# Patient Record
Sex: FEMALE | Race: WHITE | URBAN - METROPOLITAN AREA
[De-identification: names, ages, dates, MRNs, and addresses within clinical notes are randomized per-mention and may not be internally consistent; named-entity substitution may affect disease eponyms.]

---

## 2017-01-26 DIAGNOSIS — G43.919 INTRACTABLE MIGRAINE, UNSPECIFIED MIGRAINE TYPE: ICD-10-CM

## 2017-01-26 RX ORDER — SUMATRIPTAN 50 MG/1
TABLET, FILM COATED ORAL
Qty: 12 TAB | Refills: 0 | Status: SHIPPED | OUTPATIENT
Start: 2017-01-26 | End: 2018-01-30 | Stop reason: ALTCHOICE

## 2017-04-24 ENCOUNTER — OFFICE VISIT (OUTPATIENT)
Dept: INTERNAL MEDICINE CLINIC | Age: 32
End: 2017-04-24

## 2017-04-24 VITALS
OXYGEN SATURATION: 94 % | RESPIRATION RATE: 20 BRPM | WEIGHT: 208 LBS | BODY MASS INDEX: 30.81 KG/M2 | SYSTOLIC BLOOD PRESSURE: 112 MMHG | HEART RATE: 80 BPM | TEMPERATURE: 98.5 F | DIASTOLIC BLOOD PRESSURE: 83 MMHG | HEIGHT: 69 IN

## 2017-04-24 DIAGNOSIS — E78.00 ELEVATED CHOLESTEROL: Primary | ICD-10-CM

## 2017-04-24 DIAGNOSIS — E28.2 PCO (POLYCYSTIC OVARIES): ICD-10-CM

## 2017-04-24 DIAGNOSIS — E66.3 OVERWEIGHT: ICD-10-CM

## 2017-04-24 NOTE — MR AVS SNAPSHOT
Visit Information Date & Time Provider Department Dept. Phone Encounter #  
 4/24/2017  7:50 AM Mehreen Colon PA-C [facility] [phone] [id number] Your Appointments 7/6/2017  7:50 AM  
COMPLETE 40 with Kyle Valdovinos PA-C [facility] ([facility]) Appt Note: Well women [place] [address] [facility] [address]  
[facility] [address] [address] Upcoming Health Maintenance Date Due DTaP/Tdap/Td series (1 - Tdap) 10/5/2008 PAP AKA CERVICAL CYTOLOGY 5/30/2017 Allergies as of 4/24/2017  Review Complete On: 4/24/2017 By: Mehreen Colon PA-C No Known Allergies Current Immunizations  Never Reviewed Name Date Influenza Vaccine 11/12/2015 Not reviewed this visit You Were Diagnosed With   
  
 Codes Comments Elevated cholesterol    -  Primary ICD-10-CM: E78.00 ICD-9-CM: 272.0   
 PCO (polycystic ovaries)     ICD-10-CM: E28.2 ICD-9-CM: 256.4 Overweight     ICD-10-CM: V06.0 ICD-9-CM: 278.02 Vitals BP Pulse Temp Resp Height(growth percentile) Weight(growth percentile) 112/83 80 98.5 °F (36.9 °C) (Oral) 20 5' 9\" (1.753 m) 208 lb (94.3 kg) LMP SpO2 BMI OB Status Smoking Status 04/21/2017 (Exact Date) 94% 30.72 kg/m2 Having regular periods Former Smoker Vitals History BMI and BSA Data Body Mass Index Body Surface Area 30.72 kg/m 2 2.14 m 2 Preferred Pharmacy Pharmacy Name Phone [facility] [address] [facility] [address] Your Updated Medication List  
  
   
This list is accurate as of: 4/24/17  8:40 AM.  Always use your most recent med list.  
  
  
  
  
 atorvastatin 20 mg tablet Commonly known as:  LIPITOR Take 1 Tab by mouth daily. butalbital-acetaminophen-caffeine -40 mg per tablet Commonly known as:  Reginold Pablolder Take 1 Tab by mouth every six (6) hours as needed for Pain. Max Daily Amount: 4 Tabs. drospirenone-ethinyl estradiol 3-0.02 mg Tab Commonly known as:  Bryanmartín Avelar (28) Take 1 Tab by mouth daily. fluticasone 50 mcg/actuation nasal spray Commonly known as:  FLONASE  
2 sprays by Both Nostrils route daily. SUMAtriptan 50 mg tablet Commonly known as:  IMITREX Take one tablet once. May repeat dose X 1 after 2 hours We Performed the Following GLUCOSE, RANDOM [id number] CPT(R)] LIPID PANEL [56613 CPT(R)] Please provide this summary of care documentation to your next provider. Your primary care clinician is listed as Cleo Valdovinos. If you have any questions after today's visit, please call [phone].

## 2017-04-24 NOTE — PROGRESS NOTES
Reviewed record in preparation for visit and have obtained necessary documentation. Identified pt with two pt identifiers(name and ). Health Maintenance Due   Topic    DTaP/Tdap/Td series (1 - Tdap)    INFLUENZA AGE 9 TO ADULT     PAP AKA CERVICAL CYTOLOGY          No chief complaint on file. Wt Readings from Last 3 Encounters:   17 208 lb (94.3 kg)   11/15/16 199 lb (90.3 kg)   10/20/16 197 lb (89.4 kg)     Temp Readings from Last 3 Encounters:   17 98.5 °F (36.9 °C) (Oral)   11/15/16 98.6 °F (37 °C) (Oral)   10/20/16 98.8 °F (37.1 °C) (Oral)     BP Readings from Last 3 Encounters:   17 112/83   11/15/16 122/87   10/20/16 109/79     Pulse Readings from Last 3 Encounters:   17 80   11/15/16 91   10/20/16 78           Learning Assessment:  :     Learning Assessment 2017 11/15/2016 10/7/2014 2014   PRIMARY LEARNER Patient Patient Patient Patient   HIGHEST LEVEL OF EDUCATION - PRIMARY LEARNER  - - 2 YEARS [address] LEARNER - - NONE -   CO-LEARNER CAREGIVER - - No -   PRIMARY LANGUAGE ENGLISH ENGLISH ENGLISH ENGLISH   LEARNER PREFERENCE PRIMARY DEMONSTRATION DEMONSTRATION DEMONSTRATION LISTENING     - - VIDEOS DEMONSTRATION   ANSWERED BY self self self patient   RELATIONSHIP SELF SELF SELF SELF       Depression Screening:  :     PHQ 2 / 9, over the last two weeks 11/15/2016   Little interest or pleasure in doing things Not at all   Feeling down, depressed or hopeless Not at all   Total Score PHQ 2 0       Fall Risk Assessment:  :     No flowsheet data found. Abuse Screening:  :     Abuse Screening Questionnaire 2017   Do you ever feel afraid of your partner? N   Are you in a relationship with someone who physically or mentally threatens you? N   Is it safe for you to go home?  Y       Coordination of Care Questionnaire:  :     1) Have you been to an emergency room, urgent care clinic since your last visit? no   Hospitalized since your last visit? no             2) Have you seen or consulted any other health care providers outside of [address] [place] [address] since your last visit? no  (Include any pap smears or colon screenings in this section.)    3) Do you have an Advance Directive on file? no    4) Are you interested in receiving information on Advance Directives? YES      Patient is accompanied by self I have received verbal consent from [address] to discuss any/all medical information while they are present in the room.

## 2017-04-24 NOTE — PROGRESS NOTES
HISTORY OF PRESENT Velia Kenney is a 36 y.o. female. HPI  Patient presents to the office for a number of concerns. She reports when she saw her gyn doctor she had elevated cholesterol and needed to have this rechecked. She reports she is still having headaches usually once a week. Imitrex does help some. Excedrin Migraine seems to work the best. She recently moved in with her boyfriend and started a new job. She will be due for a pap smear in July and would like to come here to have it done if possible. She is interested in losing some weight. She has access to a gym. She admits she likes carbs and she often will eat out with her boyfriend. Review of Systems   HENT:        Once a week, usually resolves with Excedrin migraine   Neurological: Positive for headaches. Blood pressure 112/83, pulse 80, temperature 98.5 °F (36.9 °C), temperature source Oral, resp. rate 20, height 5' 9\" (1.753 m), weight 208 lb (94.3 kg), last menstrual period 04/21/2017, SpO2 94 %. Physical Exam   Constitutional: She appears well-developed and well-nourished. No distress. Psychiatric: She has a normal mood and affect. Her behavior is normal. Judgment and thought content normal.       ASSESSMENT and PLAN  Thom was seen today for follow up chronic condition. Diagnoses and all orders for this visit:    Elevated cholesterol  -     LIPID PANEL    PCO (polycystic ovaries)  -     GLUCOSE, RANDOM    Overweight    we talked at length about diet. She was advised cut back on carbs. She was given educational material about carb counting and healthy eating habits.  She will make an appointment to return for a well woman exam. I advised her if she is using the Excedrin only once a week that is okay but if she is using it more than that then let me know because I do now want her to have potential problems of her stomach

## 2017-04-25 ENCOUNTER — TELEPHONE (OUTPATIENT)
Dept: INTERNAL MEDICINE CLINIC | Age: 32
End: 2017-04-25

## 2017-04-25 LAB
CHOLEST SERPL-MCNC: 328 MG/DL (ref 100–199)
GLUCOSE SERPL-MCNC: 74 MG/DL (ref 65–99)
HDLC SERPL-MCNC: 65 MG/DL
INTERPRETATION, 910389: NORMAL
LDLC SERPL CALC-MCNC: 236 MG/DL (ref 0–99)
TRIGL SERPL-MCNC: 136 MG/DL (ref 0–149)
VLDLC SERPL CALC-MCNC: 27 MG/DL (ref 5–40)

## 2017-04-25 NOTE — PROGRESS NOTES
Writer contacted patient to inform of lab results per Linda Bermudez, patient verbalized understanding and will try exercise and watching her diet, patient states the medication is expensive and her numbers have come down without the medication.

## 2017-04-25 NOTE — PROGRESS NOTES
Patient had stopped the medication, it appears possibly due to cost, but would like to try the diet and exercise first.

## 2017-04-25 NOTE — PROGRESS NOTES
Please let the patient know her glucose was normal at 74. Her cholesterol is elevated at 328 total (goal under 200) and . She should be on a lipid lowering medication. I would like for her to start a statin to help with this. Other things that she can do to help is exercise regularly, diet low in saturated fats. High fiber diet. Eat foods high in omega 3's. Recheck numbers in 6 months.  thanks

## 2017-05-30 ENCOUNTER — OFFICE VISIT (OUTPATIENT)
Dept: INTERNAL MEDICINE CLINIC | Age: 32
End: 2017-05-30

## 2017-05-30 VITALS
HEART RATE: 86 BPM | SYSTOLIC BLOOD PRESSURE: 110 MMHG | RESPIRATION RATE: 15 BRPM | BODY MASS INDEX: 31.4 KG/M2 | OXYGEN SATURATION: 98 % | TEMPERATURE: 98.4 F | DIASTOLIC BLOOD PRESSURE: 78 MMHG | HEIGHT: 69 IN | WEIGHT: 212 LBS

## 2017-05-30 DIAGNOSIS — J02.9 SORE THROAT: Primary | ICD-10-CM

## 2017-05-30 RX ORDER — AMOXICILLIN 500 MG/1
500 CAPSULE ORAL 2 TIMES DAILY
Qty: 20 CAP | Refills: 0 | Status: SHIPPED | OUTPATIENT
Start: 2017-05-30 | End: 2017-07-06 | Stop reason: ALTCHOICE

## 2017-05-30 NOTE — PROGRESS NOTES
Subjective:   Tad Sanders is a 36 y.o. female who complains of sore throat and swollen glands for 4 days. She denies a history of chills, fevers, shortness of breath and wheezing. Patient does not smoke cigarettes. Relevant PMH: No pertinent additional PMH. Objective:      Visit Vitals    /78 (BP 1 Location: Right arm, BP Patient Position: Sitting)    Pulse 86    Temp 98.4 °F (36.9 °C) (Oral)    Resp 15    Ht 5' 9\" (1.753 m)    Wt 212 lb (96.2 kg)    SpO2 98%    BMI 31.31 kg/m2      Appears alert, well appearing, and in no distress. Ears: bilateral TM's and external ear canals normal  Oropharynx: erythematous, exudate noted and tonsils hypertrophied with exudate  Neck: bilateral symmetric anterior adenopathy  Lungs: clear to auscultation, no wheezes, rales or rhonchi, symmetric air entry     Rapid Strep test is not done in the office will send for a culture. Assessment/Plan:      pharyngitis  Per orders. Gargle, use acetaminophen or other OTC analgesic, and take Rx fully as prescribed. Call if other family members develop similar symptoms. See prn. Thom was seen today for sore throat. Diagnoses and all orders for this visit:    Sore throat  -     amoxicillin (AMOXIL) 500 mg capsule; Take 1 Cap by mouth two (2) times a day. -     CULTURE, STREP THROAT    .take medication as prescribed.  I will contact her with the results of the culture once back

## 2017-05-30 NOTE — LETTER
NOTIFICATION RETURN TO WORK / SCHOOL 
 
5/30/2017 8:22 AM 
 
Ms. Thom Cadena [address] [place] [address] To Whom It May Concern: Yessica Blum is currently under the care of Bri Thompson.. She will return to work/school on: 5/31/2017 If there are questions or concerns please have the patient contact our office.  
 
 
 
Sincerely, 
 
 
Trav Valdovinos PA-C

## 2017-06-02 LAB — B-HEM STREP SPEC QL CULT: ABNORMAL

## 2017-06-02 NOTE — PROGRESS NOTES
Spoke with the patient using 2 identifiers. Notified her per Cleo Valdovinos's recommendation. She states understanding and will complete the antibiotic.

## 2017-07-06 ENCOUNTER — OFFICE VISIT (OUTPATIENT)
Dept: INTERNAL MEDICINE CLINIC | Age: 32
End: 2017-07-06

## 2017-07-06 VITALS
HEART RATE: 79 BPM | RESPIRATION RATE: 20 BRPM | SYSTOLIC BLOOD PRESSURE: 109 MMHG | BODY MASS INDEX: 31.55 KG/M2 | TEMPERATURE: 98.6 F | DIASTOLIC BLOOD PRESSURE: 80 MMHG | HEIGHT: 69 IN | OXYGEN SATURATION: 99 % | WEIGHT: 213 LBS

## 2017-07-06 DIAGNOSIS — Z01.419 WELL WOMAN EXAM WITH ROUTINE GYNECOLOGICAL EXAM: Primary | ICD-10-CM

## 2017-07-06 DIAGNOSIS — E28.2 PCO (POLYCYSTIC OVARIES): ICD-10-CM

## 2017-07-06 RX ORDER — DROSPIRENONE AND ETHINYL ESTRADIOL 0.02-3(28)
1 KIT ORAL DAILY
Qty: 84 TAB | Refills: 4 | Status: SHIPPED | OUTPATIENT
Start: 2017-07-06 | End: 2018-03-05 | Stop reason: SDUPTHER

## 2017-07-06 NOTE — PROGRESS NOTES
Reviewed record in preparation for visit and have obtained necessary documentation. Identified pt with two pt identifiers(name and ). Health Maintenance Due   Topic    DTaP/Tdap/Td series (1 - Tdap)    PAP AKA CERVICAL CYTOLOGY          No chief complaint on file. Wt Readings from Last 3 Encounters:   17 213 lb (96.6 kg)   17 212 lb (96.2 kg)   17 208 lb (94.3 kg)     Temp Readings from Last 3 Encounters:   17 98.6 °F (37 °C) (Oral)   17 98.4 °F (36.9 °C) (Oral)   17 98.5 °F (36.9 °C) (Oral)     BP Readings from Last 3 Encounters:   17 109/80   17 110/78   17 112/83     Pulse Readings from Last 3 Encounters:   17 79   17 86   17 80           Learning Assessment:  :     Learning Assessment 2017 11/15/2016 10/7/2014 2014   PRIMARY LEARNER Patient Patient Patient Patient   HIGHEST LEVEL OF EDUCATION - PRIMARY LEARNER  - - 2 YEARS [address] LEARNER - - NONE -   [address]  CAREGIVER - - No -   PRIMARY LANGUAGE ENGLISH ENGLISH ENGLISH ENGLISH   LEARNER PREFERENCE PRIMARY DEMONSTRATION DEMONSTRATION DEMONSTRATION LISTENING     - - VIDEOS DEMONSTRATION   ANSWERED BY self self self patient   RELATIONSHIP SELF SELF SELF SELF       Depression Screening:  :     PHQ over the last two weeks 2017   Little interest or pleasure in doing things Not at all   Feeling down, depressed or hopeless Not at all   Total Score PHQ 2 0       Fall Risk Assessment:  :     No flowsheet data found. Abuse Screening:  :     Abuse Screening Questionnaire 2017   Do you ever feel afraid of your partner? N   Are you in a relationship with someone who physically or mentally threatens you? N   Is it safe for you to go home?  Y       Coordination of Care Questionnaire:  :     1) Have you been to an emergency room, urgent care clinic since your last visit? no   Hospitalized since your last visit? no             2) Have you seen or consulted any other health care providers outside of [address] [place] [address] since your last visit? no  (Include any pap smears or colon screenings in this section.)    3) Do you have an Advance Directive on file? no    4) Are you interested in receiving information on Advance Directives? YES      Patient is accompanied by self I have received verbal consent from [address] to discuss any/all medical information while they are present in the room.

## 2017-07-06 NOTE — PROGRESS NOTES
Subjective:   36 y.o. female for Well Woman Check. Patient's last menstrual period was 06/16/2017 (exact date). Social History: single partner, contraception - OCP (Oral Contraceptive Pills). Pertinent past medical hstory: no history of HTN, DVT, CAD, DM, liver disease, or smoking. Patient reports she is exercising regularly with walking, elliptical, and some weights. She is eating healthy. She has been eating out some due to her schedule and not having time to cook. She denies problems with the birth control. She is taking this for her PCO. Patient Active Problem List    Diagnosis Date Noted    Performance anxiety 05/22/2014    Elevated cholesterol 05/22/2014     No Known Allergies     ROS:  Feeling well. No dyspnea or chest pain on exertion. No abdominal pain, change in bowel habits, black or bloody stools. No urinary tract symptoms. GYN ROS: normal menses, no abnormal bleeding, pelvic pain or discharge, no breast pain or new or enlarging lumps on self exam. No neurological complaints. Objective:     Visit Vitals    /80    Pulse 79    Temp 98.6 °F (37 °C) (Oral)    Resp 20    Ht 5' 9\" (1.753 m)    Wt 213 lb (96.6 kg)    LMP 06/16/2017 (Exact Date)    SpO2 99%    BMI 31.45 kg/m2     The patient appears well, alert, oriented x 3, in no distress. ENT normal.  Neck supple. No adenopathy or thyromegaly. TRENT. Lungs are clear, good air entry, no wheezes, rhonchi or rales. S1 and S2 normal, no murmurs, regular rate and rhythm. Abdomen soft without tenderness, guarding, mass or organomegaly. Extremities show no edema, normal peripheral pulses. Neurological is normal, no focal findings.     BREAST EXAM: breasts appear normal, no suspicious masses, no skin or nipple changes or axillary nodes    PELVIC EXAM: VULVA: normal appearing vulva with no masses, tenderness or lesions, VAGINA: normal appearing vagina with normal color and discharge, no lesions, mild white discharge, no odor, CERVIX: normal appearing cervix without discharge or lesions, UTERUS: uterus is normal size, shape, consistency and nontender, ADNEXA: normal adnexa in size, nontender and no masses    Assessment/Plan:   well woman  pap smear  additional lab tests per orders  return annually or fadia  Thom was seen today for complete physical.    Diagnoses and all orders for this visit:    Well woman exam with routine gynecological exam  -     PAP IG, CT-NG-TV, RFX APTIMA HPV ASCUS ([id number])  -     TSH 3RD GENERATION  -     CBC WITH AUTOMATED DIFF  -     METABOLIC PANEL, COMPREHENSIVE  -     LIPID PANEL  -     VITAMIN D, 25 HYDROXY    PCO (polycystic ovaries)  -     drospirenone-ethinyl estradiol (GIANVI, 28,) 3-0.02 mg tab; Take 1 Tab by mouth daily. .routine labs ordered today. Advised her to start doing self breast exams once a month. Suggested she set it on her phone calendar to help her remember. Continue to exercise and eat healthy. I will contact her with her labs once they are back.

## 2017-07-07 LAB
25(OH)D3+25(OH)D2 SERPL-MCNC: 23.4 NG/ML (ref 30–100)
ALBUMIN SERPL-MCNC: 4.1 G/DL (ref 3.5–5.5)
ALBUMIN/GLOB SERPL: 1.4 {RATIO} (ref 1.2–2.2)
ALP SERPL-CCNC: 61 IU/L (ref 39–117)
ALT SERPL-CCNC: 18 IU/L (ref 0–32)
AST SERPL-CCNC: 15 IU/L (ref 0–40)
BASOPHILS # BLD AUTO: 0 X10E3/UL (ref 0–0.2)
BASOPHILS NFR BLD AUTO: 0 %
BILIRUB SERPL-MCNC: 0.2 MG/DL (ref 0–1.2)
BUN SERPL-MCNC: 11 MG/DL (ref 6–20)
BUN/CREAT SERPL: 20 (ref 9–23)
CALCIUM SERPL-MCNC: 9.2 MG/DL (ref 8.7–10.2)
CHLORIDE SERPL-SCNC: 100 MMOL/L (ref 96–106)
CHOLEST SERPL-MCNC: 389 MG/DL (ref 100–199)
CO2 SERPL-SCNC: 22 MMOL/L (ref 18–29)
COMMENT, 011824: ABNORMAL
CREAT SERPL-MCNC: 0.56 MG/DL (ref 0.57–1)
EOSINOPHIL # BLD AUTO: 0 X10E3/UL (ref 0–0.4)
EOSINOPHIL NFR BLD AUTO: 0 %
ERYTHROCYTE [DISTWIDTH] IN BLOOD BY AUTOMATED COUNT: 13.5 % (ref 12.3–15.4)
GLOBULIN SER CALC-MCNC: 2.9 G/DL (ref 1.5–4.5)
GLUCOSE SERPL-MCNC: 73 MG/DL (ref 65–99)
HCT VFR BLD AUTO: 38.8 % (ref 34–46.6)
HDLC SERPL-MCNC: 85 MG/DL
HGB BLD-MCNC: 13 G/DL (ref 11.1–15.9)
IMM GRANULOCYTES # BLD: 0 X10E3/UL (ref 0–0.1)
IMM GRANULOCYTES NFR BLD: 0 %
INTERPRETATION, 910389: NORMAL
LDLC SERPL CALC-MCNC: 270 MG/DL (ref 0–99)
LYMPHOCYTES # BLD AUTO: 2.1 X10E3/UL (ref 0.7–3.1)
LYMPHOCYTES NFR BLD AUTO: 27 %
MCH RBC QN AUTO: 28.3 PG (ref 26.6–33)
MCHC RBC AUTO-ENTMCNC: 33.5 G/DL (ref 31.5–35.7)
MCV RBC AUTO: 84 FL (ref 79–97)
MONOCYTES # BLD AUTO: 0.5 X10E3/UL (ref 0.1–0.9)
MONOCYTES NFR BLD AUTO: 6 %
NEUTROPHILS # BLD AUTO: 5.2 X10E3/UL (ref 1.4–7)
NEUTROPHILS NFR BLD AUTO: 67 %
PLATELET # BLD AUTO: 333 X10E3/UL (ref 150–379)
POTASSIUM SERPL-SCNC: 4.1 MMOL/L (ref 3.5–5.2)
PROT SERPL-MCNC: 7 G/DL (ref 6–8.5)
RBC # BLD AUTO: 4.6 X10E6/UL (ref 3.77–5.28)
SODIUM SERPL-SCNC: 140 MMOL/L (ref 134–144)
TRIGL SERPL-MCNC: 170 MG/DL (ref 0–149)
TSH SERPL DL<=0.005 MIU/L-ACNC: 1.52 UIU/ML (ref 0.45–4.5)
VLDLC SERPL CALC-MCNC: 34 MG/DL (ref 5–40)
WBC # BLD AUTO: 7.8 X10E3/UL (ref 3.4–10.8)

## 2017-07-07 NOTE — PROGRESS NOTES
Please let the patient know her cholesterol is more elevated this time. I would like  her to increase her Lipitor to 40 mg. I would like to recheck her numbers in 3 months. If not at goal then the next step would be to switch to Crestor. vitamin d is just a little low. Daily otc vitamin supplement is suggested.  thanks

## 2017-07-07 NOTE — PROGRESS NOTES
Writer contacted patient to inform of lab results and instruction per [facility], patient verbalized understanding.

## 2017-07-17 ENCOUNTER — TELEPHONE (OUTPATIENT)
Dept: INTERNAL MEDICINE CLINIC | Age: 32
End: 2017-07-17

## 2017-07-17 NOTE — TELEPHONE ENCOUNTER
Writer contacted patient after speaking with the lab and the specimen is being retested on a portion of the test and will have results soon, patient verbalized understanding.

## 2017-07-21 LAB
C TRACH RRNA CVX QL NAA+PROBE: NORMAL
CYTOLOGIST CVX/VAG CYTO: NORMAL
CYTOLOGY CVX/VAG DOC THIN PREP: NORMAL
DX ICD CODE: NORMAL
DX ICD CODE: NORMAL
LABCORP, 190119: NORMAL
Lab: NORMAL
N GONORRHOEA RRNA CVX QL NAA+PROBE: NEGATIVE
OTHER STN SPEC: NORMAL
PATH REPORT.FINAL DX SPEC: NORMAL
STAT OF ADQ CVX/VAG CYTO-IMP: NORMAL
T VAGINALIS RRNA SPEC QL NAA+PROBE: NEGATIVE

## 2017-07-24 ENCOUNTER — CLINICAL SUPPORT (OUTPATIENT)
Dept: INTERNAL MEDICINE CLINIC | Age: 32
End: 2017-07-24

## 2017-07-24 DIAGNOSIS — R35.0 URINARY FREQUENCY: Primary | ICD-10-CM

## 2017-07-24 DIAGNOSIS — Z11.3 SCREENING FOR STDS (SEXUALLY TRANSMITTED DISEASES): ICD-10-CM

## 2017-07-24 DIAGNOSIS — R35.0 FREQUENCY OF URINATION: Primary | ICD-10-CM

## 2017-07-24 LAB
BILIRUB UR QL STRIP: NEGATIVE
GLUCOSE UR-MCNC: NEGATIVE MG/DL
KETONES P FAST UR STRIP-MCNC: NEGATIVE MG/DL
PH UR STRIP: 6 [PH] (ref 4.6–8)
PROT UR QL STRIP: NEGATIVE MG/DL
SP GR UR STRIP: 1.01 (ref 1–1.03)
UA UROBILINOGEN AMB POC: NORMAL (ref 0.2–1)
URINALYSIS CLARITY POC: CLEAR
URINALYSIS COLOR POC: YELLOW
URINE BLOOD POC: NEGATIVE
URINE LEUKOCYTES POC: NEGATIVE
URINE NITRITES POC: NEGATIVE

## 2017-07-24 NOTE — TELEPHONE ENCOUNTER
Writer contacted patient in regards to pap results per Flory Davies, patient verbalized understanding and will come in sometime this week to give a urine sample.

## 2017-07-24 NOTE — PROGRESS NOTES
Please let the patient know her pap smear was normal. She did have some vaginal abner consistent with a yeast infection. Is patient having symptoms?  thanks

## 2017-07-24 NOTE — PROGRESS NOTES
Writer contacted patient to inform of pap results per Rafia Davis, patient verbalized understanding and is not having symptoms of a yeast infection at this time. Patient will call if any symptoms occur.

## 2017-07-27 LAB
C TRACH RRNA SPEC QL NAA+PROBE: NEGATIVE
HSV1 DNA SPEC QL NAA+PROBE: NORMAL
HSV2 DNA SPEC QL NAA+PROBE: NORMAL
N GONORRHOEA RRNA SPEC QL NAA+PROBE: NEGATIVE
T VAGINALIS RRNA SPEC QL NAA+PROBE: NEGATIVE

## 2017-07-28 NOTE — PROGRESS NOTES
Writer contacted patient to inform of lab results per Maty Parikh, patient verbalized understanding.

## 2017-09-05 ENCOUNTER — OFFICE VISIT (OUTPATIENT)
Dept: INTERNAL MEDICINE CLINIC | Age: 32
End: 2017-09-05

## 2017-09-05 VITALS
BODY MASS INDEX: 31.84 KG/M2 | DIASTOLIC BLOOD PRESSURE: 83 MMHG | RESPIRATION RATE: 20 BRPM | HEIGHT: 69 IN | SYSTOLIC BLOOD PRESSURE: 125 MMHG | OXYGEN SATURATION: 98 % | HEART RATE: 83 BPM | TEMPERATURE: 98.6 F | WEIGHT: 215 LBS

## 2017-09-05 DIAGNOSIS — K90.49 FOOD INTOLERANCE: ICD-10-CM

## 2017-09-05 DIAGNOSIS — R53.83 FATIGUE, UNSPECIFIED TYPE: Primary | ICD-10-CM

## 2017-09-05 RX ORDER — MUPIROCIN 20 MG/G
OINTMENT TOPICAL DAILY
Qty: 22 G | Refills: 0 | Status: SHIPPED | OUTPATIENT
Start: 2017-09-05 | End: 2019-02-05 | Stop reason: ALTCHOICE

## 2017-09-05 NOTE — PROGRESS NOTES
Reviewed record in preparation for visit and have obtained necessary documentation. Identified pt with two pt identifiers(name and ). Health Maintenance Due   Topic    DTaP/Tdap/Td series (1 - Tdap)    INFLUENZA AGE 9 TO ADULT          No chief complaint on file. Wt Readings from Last 3 Encounters:   17 215 lb (97.5 kg)   17 213 lb (96.6 kg)   17 212 lb (96.2 kg)     Temp Readings from Last 3 Encounters:   17 98.6 °F (37 °C) (Oral)   17 98.6 °F (37 °C) (Oral)   17 98.4 °F (36.9 °C) (Oral)     BP Readings from Last 3 Encounters:   17 109/80   17 110/78   17 112/83     Pulse Readings from Last 3 Encounters:   17 79   17 86   17 80           Learning Assessment:  :     Learning Assessment 2017 11/15/2016 10/7/2014 2014   PRIMARY LEARNER Patient Patient Patient Patient   HIGHEST LEVEL OF EDUCATION - PRIMARY LEARNER  - - 2 YEARS [address] LEARNER - - NONE -   CO-LEARNER CAREGIVER - - No -   PRIMARY LANGUAGE ENGLISH ENGLISH ENGLISH ENGLISH   LEARNER PREFERENCE PRIMARY DEMONSTRATION DEMONSTRATION DEMONSTRATION LISTENING     - - VIDEOS DEMONSTRATION   ANSWERED BY self self self patient   RELATIONSHIP SELF SELF SELF SELF       Depression Screening:  :     PHQ over the last two weeks 2017   Little interest or pleasure in doing things Not at all   Feeling down, depressed or hopeless Not at all   Total Score PHQ 2 0       Fall Risk Assessment:  :     No flowsheet data found. Abuse Screening:  :     Abuse Screening Questionnaire 2017   Do you ever feel afraid of your partner? N   Are you in a relationship with someone who physically or mentally threatens you? N   Is it safe for you to go home?  Y       Coordination of Care Questionnaire:  :     1) Have you been to an emergency room, urgent care clinic since your last visit? no   Hospitalized since your last visit? no             2) Have you seen or consulted any other health care providers outside of [address] [place] [address] since your last visit? no  (Include any pap smears or colon screenings in this section.)    3) Do you have an Advance Directive on file? yes    4) Are you interested in receiving information on Advance Directives? NO      Patient is accompanied by self I have received verbal consent from [address] to discuss any/all medical information while they are present in the room.

## 2017-09-05 NOTE — PROGRESS NOTES
HISTORY OF PRESENT Colette Mix is a 36 y.o. female. HPI  Patient presents to the office for evaluation of her nose and lab testing. She reports off and on for the past month she has been having scabs in her nose. The scabs seem to come and go. She denies trauma to the area. She use to be on flonase but stopped that a while back. She has used nasal saline which does help some. She would also like to have celiac testing. She reports she has been noticing some foods make her feel tired. She had a migraine this weekend but was not sure if it was related to foods are not. She has been eating out a lot meals out recently. She has not been keeping a food log. Review of Systems   Constitutional: Positive for malaise/fatigue. Negative for chills and fever. HENT:        Scab in nose. Blood pressure 125/83, pulse 83, temperature 98.6 °F (37 °C), temperature source Oral, resp. rate 20, height 5' 9\" (1.753 m), weight 215 lb (97.5 kg), last menstrual period 08/14/2017, SpO2 98 %. Physical Exam   HENT:   Nose- small scab noted just inside the nostril on the right side. ASSESSMENT and PLAN  Diagnoses and all orders for this visit:    1. Fatigue, unspecified type  -     CELIAC ANTIBODY PROFILE    2. Food intolerance  -     CELIAC ANTIBODY PROFILE    Other orders  -     mupirocin (BACTROBAN) 2 % ointment; by Both Nostrils route daily. trial of Bactroban on the areas in her nose. Lab test ordered for celiac. I will contact her with the results when back. She will let me know if her nose is not getting better. All this was discussed with the patient and she agrees and understands the plan.

## 2017-09-05 NOTE — MR AVS SNAPSHOT
Visit Information Date & Time Provider Department Dept. Phone Encounter #  
 9/5/2017 10:50 AM Felicia Reed PA-C [facility] [phone] [id number] Upcoming Health Maintenance Date Due DTaP/Tdap/Td series (1 - Tdap) 10/5/2008 INFLUENZA AGE 9 TO ADULT 8/1/2017 PAP AKA CERVICAL CYTOLOGY 7/6/2020 Allergies as of 9/5/2017  In Progress On: 9/5/2017 By: Keli Ndiaye LPN No Known Allergies Current Immunizations  Never Reviewed Name Date Influenza Vaccine 11/12/2015 Not reviewed this visit You Were Diagnosed With   
  
 Codes Comments Fatigue, unspecified type    -  Primary ICD-10-CM: R53.83 ICD-9-CM: 780.79 Food intolerance     ICD-10-CM: K90.49 ICD-9-CM: 579.8 Vitals BP Pulse Temp Resp Height(growth percentile) Weight(growth percentile) 125/83 83 98.6 °F (37 °C) (Oral) 20 5' 9\" (1.753 m) 215 lb (97.5 kg) LMP SpO2 BMI OB Status Smoking Status 08/14/2017 (Exact Date) 98% 31.75 kg/m2 Having regular periods Former Smoker Vitals History BMI and BSA Data Body Mass Index Body Surface Area 31.75 kg/m 2 2.18 m 2 Preferred Pharmacy Pharmacy Name Phone [facility] [address] Your Updated Medication List  
  
   
This list is accurate as of: 9/5/17 11:11 AM.  Always use your most recent med list.  
  
  
  
  
 atorvastatin 20 mg tablet Commonly known as:  LIPITOR Take 1 Tab by mouth daily. drospirenone-ethinyl estradiol 3-0.02 mg Tab Commonly known as:  Stoney Pacer (28) Take 1 Tab by mouth daily. fluticasone 50 mcg/actuation nasal spray Commonly known as:  FLONASE  
2 sprays by Both Nostrils route daily. mupirocin 2 % ointment Commonly known as:  BACTROBAN  
by Both Nostrils route daily. SUMAtriptan 50 mg tablet Commonly known as:  IMITREX Take one tablet once. May repeat dose X 1 after 2 hours Prescriptions Sent to Pharmacy Refills  
 mupirocin (BACTROBAN) 2 % ointment 0 Sig: by Both Nostrils route daily. Class: Normal  
 Pharmacy: [address] [place] [address] #: [phone] Route: Both Nostrils We Performed the Following CELIAC ANTIBODY PROFILE [[id number] Custom] Please provide this summary of care documentation to your next provider. Your primary care clinician is listed as Cleo Valdovinos. If you have any questions after today's visit, please call [phone].

## 2017-09-07 LAB
GLIADIN PEPTIDE IGA SER-ACNC: 6 UNITS (ref 0–19)
GLIADIN PEPTIDE IGG SER-ACNC: 1 UNITS (ref 0–19)
IGA SERPL-MCNC: 251 MG/DL (ref 87–352)
TTG IGA SER-ACNC: <2 U/ML (ref 0–3)
TTG IGG SER-ACNC: <2 U/ML (ref 0–5)

## 2017-09-08 NOTE — PROGRESS NOTES
Writer contacted patient to inform of lab results per Yun Davison, patient verbalized understanding.

## 2017-11-27 ENCOUNTER — TELEPHONE (OUTPATIENT)
Dept: INTERNAL MEDICINE CLINIC | Age: 32
End: 2017-11-27

## 2017-11-27 RX ORDER — AMITRIPTYLINE HYDROCHLORIDE 10 MG/1
10 TABLET, FILM COATED ORAL
Qty: 30 TAB | Refills: 1 | Status: SHIPPED | OUTPATIENT
Start: 2017-11-27 | End: 2020-02-20 | Stop reason: ALTCHOICE

## 2017-11-28 NOTE — TELEPHONE ENCOUNTER
Elavil 10 mg has been sent to the pharmacy for migraine prevention. She will contact me to let me know how  She is doing.

## 2018-01-22 ENCOUNTER — OFFICE VISIT (OUTPATIENT)
Dept: INTERNAL MEDICINE CLINIC | Age: 33
End: 2018-01-22

## 2018-01-22 VITALS
HEIGHT: 69 IN | TEMPERATURE: 98.5 F | DIASTOLIC BLOOD PRESSURE: 82 MMHG | BODY MASS INDEX: 32.44 KG/M2 | WEIGHT: 219 LBS | SYSTOLIC BLOOD PRESSURE: 121 MMHG | OXYGEN SATURATION: 94 % | HEART RATE: 81 BPM | RESPIRATION RATE: 20 BRPM

## 2018-01-22 DIAGNOSIS — H81.10 BENIGN PAROXYSMAL POSITIONAL VERTIGO, UNSPECIFIED LATERALITY: Primary | ICD-10-CM

## 2018-01-22 RX ORDER — MECLIZINE HYDROCHLORIDE 25 MG/1
25 TABLET ORAL
Qty: 21 TAB | Refills: 0 | Status: SHIPPED | OUTPATIENT
Start: 2018-01-22 | End: 2018-01-29

## 2018-01-22 NOTE — PROGRESS NOTES
HISTORY OF PRESENT ILLNESS  Thom Wilcox is a 29 y.o. female. HPI  Patient presents to the office for evaluation of dizziness. She reports she had a migraine over the weekend. She believes it may have been triggered by the change in weather because she was having some sinus symptoms. She took an Excedrin and went to bed. The migraine subsided but she noticed she was off balance a little. She thought maybe it was due to being sleepy. The dizziness has continued although a little better. She describes it as a spinning like sensation. Also she wanted to let me know that wine and her cycle seems to trigger her migraines. Review of Systems   Neurological: Positive for dizziness. Negative for tingling, tremors, sensory change, speech change and headaches. Blood pressure 121/82, pulse 81, temperature 98.5 °F (36.9 °C), temperature source Oral, resp. rate 20, height 5' 9\" (1.753 m), weight 219 lb (99.3 kg), last menstrual period 12/15/2017, SpO2 94 %. Physical Exam   Neurological: No cranial nerve deficit. Coordination normal.   Increase spinning like sensation noted with turning head back and forth in supine position. ASSESSMENT and PLAN  Diagnoses and all orders for this visit:    1. Benign paroxysmal positional vertigo, unspecified laterality    Other orders  -     meclizine (ANTIVERT) 25 mg tablet; Take 1 Tab by mouth three (3) times daily as needed for up to 7 days. patient given vertigo exercises to try. She has been advised to contact the office if not getting better. She may need referral to ENT at that point. All this discussed with the patient and she understands and agrees.

## 2018-01-22 NOTE — MR AVS SNAPSHOT
[address] [place] [address] [address] [facility] 
[phone] Patient: Carmelita Nunez MRN: [id number] :1985 Visit Information Date & Time Provider Department Dept. Phone Encounter #  
 2018 11:10 AM Jaclyn Engel PA-C [facility] [phone] [id number] Upcoming Health Maintenance Date Due DTaP/Tdap/Td series (1 - Tdap) 10/5/2008 PAP AKA CERVICAL CYTOLOGY 2020 Allergies as of 2018  In Progress On: 2018 By: Carlos Miller LPN No Known Allergies Current Immunizations  Never Reviewed Name Date Influenza Vaccine 2015 Not reviewed this visit Vitals BP Pulse Temp Resp Height(growth percentile) Weight(growth percentile) 121/82 81 98.5 °F (36.9 °C) (Oral) 20 5' 9\" (1.753 m) 219 lb (99.3 kg) LMP SpO2 BMI OB Status Smoking Status 12/15/2017 (Exact Date) 94% 32.34 kg/m2 Having regular periods Former Smoker Vitals History BMI and BSA Data Body Mass Index Body Surface Area  
 32.34 kg/m 2 2.2 m 2 Preferred Pharmacy Pharmacy Name Phone 500 [address], [address] [place] [address] [phone] Your Updated Medication List  
  
   
This list is accurate as of: 18 11:46 AM.  Always use your most recent med list.  
  
  
  
  
 amitriptyline 10 mg tablet Commonly known as:  ELAVIL Take 1 Tab by mouth nightly. atorvastatin 20 mg tablet Commonly known as:  LIPITOR Take 1 Tab by mouth daily. drospirenone-ethinyl estradiol 3-0.02 mg Tab Commonly known as:  Thomos Sierras (28) Take 1 Tab by mouth daily. fluticasone 50 mcg/actuation nasal spray Commonly known as:  FLONASE  
2 sprays by Both Nostrils route daily. meclizine 25 mg tablet Commonly known as:  ANTIVERT Take 1 Tab by mouth three (3) times daily as needed for up to 7 days. mupirocin 2 % ointment Commonly known as:  BACTROBAN  
by Both Nostrils route daily. SUMAtriptan 50 mg tablet Commonly known as:  IMITREX Take one tablet once. May repeat dose X 1 after 2 hours Prescriptions Sent to Pharmacy Refills  
 meclizine (ANTIVERT) 25 mg tablet 0 Sig: Take 1 Tab by mouth three (3) times daily as needed for up to 7 days. Class: Normal  
 Pharmacy: [address] [place] [address]  Ph #: [phone] Route: Oral  
  
Introducing [facility] SERVICES! Dear Ziggy Clubs: 
Thank you for requesting a [employer] account. Our records indicate that you already have an active Quintura account. You can access your account anytime at [email or web address] Did you know that you can access your hospital and ER discharge instructions at any time in Quintura? You can also review all of your test results from your hospital stay or ER visit. Additional Information If you have questions, please visit the Frequently Asked Questions section of the Quintura website at [email or web address] Remember, Quintura is NOT to be used for urgent needs. For medical emergencies, dial 911. Now available from your iPhone and Android! Please provide this summary of care documentation to your next provider. Your primary care clinician is listed as Cleo Valdovinos. If you have any questions after today's visit, please call [phone].

## 2018-01-22 NOTE — PROGRESS NOTES
Reviewed record in preparation for visit and have obtained necessary documentation. Identified pt with two pt identifiers(name and ). Health Maintenance Due   Topic    DTaP/Tdap/Td series (1 - Tdap)    Influenza Age 5 to Adult          No chief complaint on file. Wt Readings from Last 3 Encounters:   18 219 lb (99.3 kg)   17 215 lb (97.5 kg)   17 213 lb (96.6 kg)     Temp Readings from Last 3 Encounters:   18 98.5 °F (36.9 °C) (Oral)   17 98.6 °F (37 °C) (Oral)   17 98.6 °F (37 °C) (Oral)     BP Readings from Last 3 Encounters:   17 125/83   17 109/80   17 110/78     Pulse Readings from Last 3 Encounters:   17 83   17 79   17 86           Learning Assessment:  :     Learning Assessment 2018 2017 11/15/2016 10/7/2014 2014   PRIMARY LEARNER Patient Patient Patient Patient Patient   HIGHEST LEVEL OF EDUCATION - PRIMARY LEARNER  - - - [address] CAREGIVER - - - No -   PRIMARY LANGUAGE ENGLISH ENGLISH ENGLISH ENGLISH ENGLISH   LEARNER PREFERENCE PRIMARY DEMONSTRATION DEMONSTRATION DEMONSTRATION DEMONSTRATION LISTENING     - - - VIDEOS DEMONSTRATION   ANSWERED BY self self self self patient   RELATIONSHIP SELF SELF SELF SELF SELF       Depression Screening:  :     PHQ over the last two weeks 2017   Little interest or pleasure in doing things Not at all   Feeling down, depressed or hopeless Not at all   Total Score PHQ 2 0       Fall Risk Assessment:  :     No flowsheet data found. Abuse Screening:  :     Abuse Screening Questionnaire 2018   Do you ever feel afraid of your partner? N N   Are you in a relationship with someone who physically or mentally threatens you? N N   Is it safe for you to go home? Y Y       Coordination of Care Questionnaire:  :     1) Have you been to an emergency room, urgent care clinic since your last visit? no   Hospitalized since your last visit? no             2) Have you seen or consulted any other health care providers outside of [address] [place] [address] since your last visit? no  (Include any pap smears or colon screenings in this section.)    3) Do you have an Advance Directive on file? yes    4) Are you interested in receiving information on Advance Directives? NO      Patient is accompanied by self I have received verbal consent from [address] to discuss any/all medical information while they are present in the room.

## 2018-01-30 ENCOUNTER — TELEPHONE (OUTPATIENT)
Dept: INTERNAL MEDICINE CLINIC | Age: 33
End: 2018-01-30

## 2018-01-30 DIAGNOSIS — G43.821 MENSTRUAL MIGRAINE WITH STATUS MIGRAINOSUS, NOT INTRACTABLE: Primary | ICD-10-CM

## 2018-01-30 RX ORDER — FROVATRIPTAN SUCCINATE 2.5 MG/1
TABLET, FILM COATED ORAL
Qty: 7 TAB | Refills: 3 | Status: SHIPPED | OUTPATIENT
Start: 2018-01-30 | End: 2019-07-16 | Stop reason: SDUPTHER

## 2018-02-02 NOTE — TELEPHONE ENCOUNTER
received approval for frovatriptan and fax to [facility] [phone].   Approval 2-2-18 to 2-2-19

## 2018-03-05 DIAGNOSIS — E28.2 PCO (POLYCYSTIC OVARIES): ICD-10-CM

## 2018-03-06 RX ORDER — DROSPIRENONE AND ETHINYL ESTRADIOL 0.02-3(28)
1 KIT ORAL DAILY
Qty: 84 TAB | Refills: 4 | Status: SHIPPED | OUTPATIENT
Start: 2018-03-06 | End: 2018-06-09 | Stop reason: SDUPTHER

## 2018-03-27 ENCOUNTER — OFFICE VISIT (OUTPATIENT)
Dept: INTERNAL MEDICINE CLINIC | Age: 33
End: 2018-03-27

## 2018-03-27 VITALS
DIASTOLIC BLOOD PRESSURE: 85 MMHG | WEIGHT: 223 LBS | SYSTOLIC BLOOD PRESSURE: 124 MMHG | TEMPERATURE: 98.7 F | HEART RATE: 81 BPM | RESPIRATION RATE: 20 BRPM | HEIGHT: 69 IN | OXYGEN SATURATION: 98 % | BODY MASS INDEX: 33.03 KG/M2

## 2018-03-27 DIAGNOSIS — F41.8 PERFORMANCE ANXIETY: ICD-10-CM

## 2018-03-27 DIAGNOSIS — R09.81 NASAL CONGESTION: Primary | ICD-10-CM

## 2018-03-27 RX ORDER — PROPRANOLOL HYDROCHLORIDE 20 MG/1
TABLET ORAL
Qty: 30 TAB | Refills: 0 | Status: SHIPPED | OUTPATIENT
Start: 2018-03-27 | End: 2018-06-09 | Stop reason: SDUPTHER

## 2018-03-27 RX ORDER — FLUTICASONE PROPIONATE 50 MCG
2 SPRAY, SUSPENSION (ML) NASAL DAILY
Qty: 3 BOTTLE | Refills: 1 | Status: SHIPPED | OUTPATIENT
Start: 2018-03-27 | End: 2018-05-15 | Stop reason: SDUPTHER

## 2018-03-27 NOTE — PROGRESS NOTES
Reviewed record in preparation for visit and have obtained necessary documentation. Identified pt with two pt identifiers(name and ). Health Maintenance Due   Topic    DTaP/Tdap/Td series (1 - Tdap)         No chief complaint on file. Wt Readings from Last 3 Encounters:   18 223 lb (101.2 kg)   18 219 lb (99.3 kg)   17 215 lb (97.5 kg)     Temp Readings from Last 3 Encounters:   18 98.7 °F (37.1 °C) (Oral)   18 98.5 °F (36.9 °C) (Oral)   17 98.6 °F (37 °C) (Oral)     BP Readings from Last 3 Encounters:   18 124/85   18 121/82   17 125/83     Pulse Readings from Last 3 Encounters:   18 81   18 81   17 83           Learning Assessment:  :     Learning Assessment 2018 2017 11/15/2016 10/7/2014 2014   PRIMARY LEARNER Patient Patient Patient Patient Patient   HIGHEST LEVEL OF EDUCATION - PRIMARY LEARNER  - - - 2 YEARS [facility] CAREGIVER - - - No -   PRIMARY LANGUAGE ENGLISH ENGLISH ENGLISH ENGLISH ENGLISH   LEARNER PREFERENCE PRIMARY DEMONSTRATION DEMONSTRATION DEMONSTRATION DEMONSTRATION LISTENING     - - - VIDEOS DEMONSTRATION   ANSWERED BY self self self self patient   RELATIONSHIP SELF SELF SELF SELF SELF       Depression Screening:  :     PHQ over the last two weeks 3/27/2018   Little interest or pleasure in doing things Not at all   Feeling down, depressed or hopeless Not at all   Total Score PHQ 2 0       Fall Risk Assessment:  :     No flowsheet data found. Abuse Screening:  :     Abuse Screening Questionnaire 2018   Do you ever feel afraid of your partner? N N   Are you in a relationship with someone who physically or mentally threatens you? N N   Is it safe for you to go home?  Y Y       Coordination of Care Questionnaire:  :     1) Have you been to an emergency room, urgent care clinic since your last visit? no   Hospitalized since your last visit? no             2) Have you seen or consulted any other health care providers outside of [address] [place] [address] since your last visit? no  (Include any pap smears or colon screenings in this section.)    3) Do you have an Advance Directive on file? no    4) Are you interested in receiving information on Advance Directives? YES      Patient is accompanied by self I have received verbal consent from [address] to discuss any/all medical information while they are present in the room.

## 2018-03-27 NOTE — PROGRESS NOTES
HISTORY OF PRESENT ILLNESS  Thom Sultana is a 29 y.o. female. HPI  Patient presents to the office for follow up and to discuss two issues. She reports she has been having nasal congestion. She describes it as feeling like her nasal passage is swollen. It seems to be primarily on the left side. She denies having runny or itchy nose. Also she reports she has a problem with feeling anxious in certain situations such as giving a talk. She reports she starts to feel hot and her knees and chest will get red. She remembers being on a medication many years ago for this. Review of Systems   HENT: Positive for congestion. Blood pressure 124/85, pulse 81, temperature 98.7 °F (37.1 °C), temperature source Oral, resp. rate 20, height 5' 9\" (1.753 m), weight 223 lb (101.2 kg), last menstrual period 02/27/2018, SpO2 98 %. Physical Exam   Constitutional: She appears well-developed and well-nourished. No distress. HENT:   Nose- mild edema noted of the right turbinate. Left appears normal.   Psychiatric: She has a normal mood and affect. Her behavior is normal. Judgment and thought content normal.       ASSESSMENT and PLAN  Diagnoses and all orders for this visit:    1. Nasal congestion    2. Performance anxiety  -     propranolol (INDERAL) 20 mg tablet; Take one tablet 30 to 60 minutes before the anxiety inducing event    Other orders  -     fluticasone (FLONASE) 50 mcg/actuation nasal spray; 2 Sprays by Both Nostrils route daily. trial of flonase for the nasal congestion. We talked about the use of Afrin and how this is an option for short term use of 2-3 days only. Trial of propanolol for the the performance anxiety. She will let me know if this works or not for her.  All this discussed with the patient and she understands and agrees

## 2018-03-27 NOTE — MR AVS SNAPSHOT
[address] [place] [address] [address] [place] [address] 
[phone] Patient: Shyam Caro MRN: [id number] :1985 Visit Information Date & Time Provider Department Dept. Phone Encounter #  
 3/27/2018  9:50 AM Elvia Pierce PA-C [facility] [phone] [id number] Upcoming Health Maintenance Date Due DTaP/Tdap/Td series (1 - Tdap) 10/5/2008 PAP AKA CERVICAL CYTOLOGY 2020 Allergies as of 3/27/2018  In Progress On: 3/27/2018 By: Jenaro Ochoa LPN No Known Allergies Current Immunizations  Never Reviewed Name Date Influenza Vaccine 2015 Not reviewed this visit You Were Diagnosed With   
  
 Codes Comments Performance anxiety    -  Primary ICD-10-CM: F41.8 ICD-9-CM: 300.09 Vitals BP Pulse Temp Resp Height(growth percentile) Weight(growth percentile) 124/85 81 98.7 °F (37.1 °C) (Oral) 20 5' 9\" (1.753 m) 223 lb (101.2 kg) LMP SpO2 BMI OB Status Smoking Status 2018 (Exact Date) 98% 32.93 kg/m2 Having regular periods Former Smoker BMI and BSA Data Body Mass Index Body Surface Area  
 32.93 kg/m 2 2.22 m 2 Preferred Pharmacy Pharmacy Name Phone 500 [address], [address] [place] [address] [phone] Your Updated Medication List  
  
   
This list is accurate as of 3/27/18 10:21 AM.  Always use your most recent med list.  
  
  
  
  
 amitriptyline 10 mg tablet Commonly known as:  ELAVIL Take 1 Tab by mouth nightly. atorvastatin 20 mg tablet Commonly known as:  LIPITOR Take 1 Tab by mouth daily. drospirenone-ethinyl estradiol 3-0.02 mg Tab Commonly known as:  Michael Tracy (28) Take 1 Tab by mouth daily. * fluticasone 50 mcg/actuation nasal spray Commonly known as:  FLONASE  
2 sprays by Both Nostrils route daily. * fluticasone 50 mcg/actuation nasal spray Commonly known as:  Memory Lust 2 Sprays by Both Nostrils route daily. frovatriptan 2.5 mg tablet Commonly known as:  Erik Peasant Take one tablet daily two days before start of cycle and continue one tablet daily for 5 additional days  
  
 mupirocin 2 % ointment Commonly known as:  BACTROBAN  
by Both Nostrils route daily. propranolol 20 mg tablet Commonly known as:  INDERAL Take one tablet 30 to 60 minutes before the anxiety inducing event * Notice: This list has 2 medication(s) that are the same as other medications prescribed for you. Read the directions carefully, and ask your doctor or other care provider to review them with you. Prescriptions Sent to Pharmacy Refills  
 fluticasone (FLONASE) 50 mcg/actuation nasal spray 1 Si Sprays by Both Nostrils route daily. Class: Normal  
 Pharmacy: [address] [place] [address]  Ph #: [phone] Route: Both Nostrils  
 propranolol (INDERAL) 20 mg tablet 0 Sig: Take one tablet 30 to 60 minutes before the anxiety inducing event Class: Normal  
 Pharmacy: [address] [place] [address]  Ph #: [phone] Introducing [facility] SERVICES! Dear Coleman Slater: 
Thank you for requesting a [employer] account. Our records indicate that you already have an active [employer] account. You can access your account anytime at [email or web address] Did you know that you can access your hospital and ER discharge instructions at any time in [employer]? You can also review all of your test results from your hospital stay or ER visit. Additional Information If you have questions, please visit the Frequently Asked Questions section of the [employer] website at [email or web address] Remember, [employer] is NOT to be used for urgent needs. For medical emergencies, dial 911. Now available from your iPhone and Android! Please provide this summary of care documentation to your next provider. Your primary care clinician is listed as Cleo Valdovinos. If you have any questions after today's visit, please call [phone].

## 2018-03-30 ENCOUNTER — TELEPHONE (OUTPATIENT)
Dept: INTERNAL MEDICINE CLINIC | Age: 33
End: 2018-03-30

## 2018-03-30 NOTE — TELEPHONE ENCOUNTER
Writer received approval for [employer] and fax to [employer] [phone]. Approval dates: 3-30-18 to 3-30-19.

## 2018-04-17 NOTE — TELEPHONE ENCOUNTER
From: Kendra Rushing  To: Rachel Crawford PA-C  Sent: 3/5/2018 4:12 PM EST  Subject: Medication Renewal Request    Original authorizing provider: MARTHA Yo would like a refill of the following medications:  drospirenone-ethinyl estradiol (GIANVI, 28,) 3-0.02 mg tab Katie Valdovinos PA-C]    Preferred pharmacy: [address] [place] [address]    Comment:  I am on my last month. Do I need to see you again to get a new perscriptuon or can I get a refill of 3 months?  Thank you yes

## 2018-05-15 ENCOUNTER — HOSPITAL ENCOUNTER (OUTPATIENT)
Dept: ULTRASOUND IMAGING | Age: 33
Discharge: HOME | End: 2018-05-15
Payer: COMMERCIAL

## 2018-05-15 ENCOUNTER — OFFICE VISIT (OUTPATIENT)
Dept: INTERNAL MEDICINE CLINIC | Age: 33
End: 2018-05-15

## 2018-05-15 VITALS
WEIGHT: 220 LBS | TEMPERATURE: 99.6 F | SYSTOLIC BLOOD PRESSURE: 134 MMHG | BODY MASS INDEX: 32.58 KG/M2 | DIASTOLIC BLOOD PRESSURE: 91 MMHG | HEART RATE: 129 BPM | RESPIRATION RATE: 20 BRPM | OXYGEN SATURATION: 98 % | HEIGHT: 69 IN

## 2018-05-15 DIAGNOSIS — M79.605 PAIN IN BOTH LOWER EXTREMITIES: ICD-10-CM

## 2018-05-15 DIAGNOSIS — R60.9 EDEMA, PERIPHERAL: ICD-10-CM

## 2018-05-15 DIAGNOSIS — M79.604 PAIN IN BOTH LOWER EXTREMITIES: ICD-10-CM

## 2018-05-15 DIAGNOSIS — R21 RASH: Primary | ICD-10-CM

## 2018-05-15 PROCEDURE — 93970 EXTREMITY STUDY: CPT

## 2018-05-15 RX ORDER — PREDNISONE 10 MG/1
TABLET ORAL
Qty: 21 TAB | Refills: 0 | Status: SHIPPED | OUTPATIENT
Start: 2018-05-15 | End: 2019-02-05 | Stop reason: ALTCHOICE

## 2018-05-15 NOTE — PROGRESS NOTES
HISTORY OF PRESENT ILLNESS  Thom Dove is a 29 y.o. female. HPI  Patient presents to the office for evaluation of her legs and a rash. She reports she has been very stressed out at her job due to an upcoming dead line. She states she has been sitting for 12 hours a day for the past 15 days. She has been having back pain and knee and joint pain. This past Saturday 5/12/2018 she noticed she was developing a rash on her legs. She does have two cats and two dogs but does not feel like it is related because her boy friend does not have a similar rash. Saturday is when she developed swelling of her legs and pain. She tried to elevate them some and she took one motrin. The motrin seemed to relieve the pain some the first time she took it but not the second time. She denies feeling short of breath or having chest pain. Her legs hurt with walking. Review of Systems   Constitutional: Negative for chills and fever. Musculoskeletal: Positive for joint pain and myalgias. Skin: Positive for rash. Negative for itching. Neurological: Negative for focal weakness. Physical Exam   Musculoskeletal: She exhibits edema and tenderness. Bilateral swelling of the lower legs left >right. Tenderness of the distal calf area. On warmth of the legs appreciated. Skin: Rash noted. erythremic  papule rash of right and left lower extremity. ASSESSMENT and PLAN  Diagnoses and all orders for this visit:    1. Rash  -     CBC WITH AUTOMATED DIFF  -     predniSONE (STERAPRED DS) 10 mg dose pack; See administration instruction per 10mg dose pack    2. Pain in both lower extremities  -     CK  -     CBC WITH AUTOMATED DIFF  -     METABOLIC PANEL, COMPREHENSIVE  -     VITAMIN D, 25 HYDROXY  -     DUPLEX LOWER EXT VENOUS BILAT; Future    3. Edema, peripheral  -     DUPLEX LOWER EXT VENOUS BILAT; Future    patient to have doppler studies done of her legs. She is to get some labs done.  I will speak with Dr. Boo Rolle about this patient. All this was discussed with the patient and she understands and agrees. Addendum: radiologist called to say dopplers were negative. I will start the patient on a steroid pack and will contact her with labs once they are back.

## 2018-05-15 NOTE — LETTER
NOTIFICATION RETURN TO WORK  
 
5/15/2018 1:25 PM 
 
Ms. Thom Puentes [address] [place] [id number] To Whom It May Concern: Donna Barger is currently under the care of Bri Thompson.. She will return to work on: 5/17/18. If there are questions or concerns please have the patient contact our office.  
 
 
 
Sincerely, 
 
 
Sakina Valdovinos PA-C

## 2018-05-15 NOTE — PROGRESS NOTES
Writer contacted patient to inform of doppler results and instruction of medication per Zo Short, patient verbalized understanding and requested a sick note for today and tomorrow and will be emailed to patient.

## 2018-05-15 NOTE — PROGRESS NOTES
Please let the patient know her doppler studies were negative. I will be sending a steroid pack to the pharmacy for her to start. Stay well hydrated and get up and walk around every hour or so. Follow up here if not getting better. I will contact her with the results of her labs. I do plan on adding one more lab.  thanks

## 2018-05-16 LAB
25(OH)D3+25(OH)D2 SERPL-MCNC: 32.1 NG/ML (ref 30–100)
ALBUMIN SERPL-MCNC: 4.1 G/DL (ref 3.5–5.5)
ALBUMIN/GLOB SERPL: 1.3 {RATIO} (ref 1.2–2.2)
ALP SERPL-CCNC: 74 IU/L (ref 39–117)
ALT SERPL-CCNC: 19 IU/L (ref 0–32)
AST SERPL-CCNC: 15 IU/L (ref 0–40)
BASOPHILS # BLD AUTO: 0 X10E3/UL (ref 0–0.2)
BASOPHILS NFR BLD AUTO: 0 %
BILIRUB SERPL-MCNC: 0.2 MG/DL (ref 0–1.2)
BUN SERPL-MCNC: 8 MG/DL (ref 6–20)
BUN/CREAT SERPL: 11 (ref 9–23)
CALCIUM SERPL-MCNC: 9.1 MG/DL (ref 8.7–10.2)
CHLORIDE SERPL-SCNC: 99 MMOL/L (ref 96–106)
CK SERPL-CCNC: 57 U/L (ref 24–173)
CO2 SERPL-SCNC: 19 MMOL/L (ref 18–29)
CREAT SERPL-MCNC: 0.75 MG/DL (ref 0.57–1)
EOSINOPHIL # BLD AUTO: 0 X10E3/UL (ref 0–0.4)
EOSINOPHIL NFR BLD AUTO: 0 %
ERYTHROCYTE [DISTWIDTH] IN BLOOD BY AUTOMATED COUNT: 13.3 % (ref 12.3–15.4)
GFR SERPLBLD CREATININE-BSD FMLA CKD-EPI: 107 ML/MIN/1.73
GFR SERPLBLD CREATININE-BSD FMLA CKD-EPI: 124 ML/MIN/1.73
GLOBULIN SER CALC-MCNC: 3.2 G/DL (ref 1.5–4.5)
GLUCOSE SERPL-MCNC: 95 MG/DL (ref 65–99)
HCT VFR BLD AUTO: 38.5 % (ref 34–46.6)
HGB BLD-MCNC: 12.3 G/DL (ref 11.1–15.9)
IMM GRANULOCYTES # BLD: 0 X10E3/UL (ref 0–0.1)
IMM GRANULOCYTES NFR BLD: 0 %
LYMPHOCYTES # BLD AUTO: 1.3 X10E3/UL (ref 0.7–3.1)
LYMPHOCYTES NFR BLD AUTO: 14 %
MCH RBC QN AUTO: 26.4 PG (ref 26.6–33)
MCHC RBC AUTO-ENTMCNC: 31.9 G/DL (ref 31.5–35.7)
MCV RBC AUTO: 83 FL (ref 79–97)
MONOCYTES # BLD AUTO: 0.6 X10E3/UL (ref 0.1–0.9)
MONOCYTES NFR BLD AUTO: 6 %
NEUTROPHILS # BLD AUTO: 7.2 X10E3/UL (ref 1.4–7)
NEUTROPHILS NFR BLD AUTO: 80 %
PLATELET # BLD AUTO: 314 X10E3/UL (ref 150–379)
POTASSIUM SERPL-SCNC: 4.3 MMOL/L (ref 3.5–5.2)
PROT SERPL-MCNC: 7.3 G/DL (ref 6–8.5)
RBC # BLD AUTO: 4.66 X10E6/UL (ref 3.77–5.28)
SODIUM SERPL-SCNC: 136 MMOL/L (ref 134–144)
WBC # BLD AUTO: 9.1 X10E3/UL (ref 3.4–10.8)

## 2018-05-16 NOTE — PROGRESS NOTES
Writer attempted to reach patient, no answer, LM on VM to return call to office. Sed rate added, also ask patient if she was able to get her medication or do we need to call into another pharmacy?

## 2018-05-16 NOTE — PROGRESS NOTES
Writer spoke with patient to inform of labs per Violette Stubbs, patient verbalized understanding, patient did get her medication and is feeling better.

## 2018-05-16 NOTE — PROGRESS NOTES
Please let the patient know her labs look good. How is she feeling? Please add a sed rate.  (dx: rash edema of legs.) thanks

## 2018-05-18 LAB
ERYTHROCYTE [SEDIMENTATION RATE] IN BLOOD BY WESTERGREN METHOD: 7 MM/HR (ref 0–32)
SPECIMEN STATUS REPORT, ROLRST: NORMAL

## 2018-06-09 DIAGNOSIS — E28.2 PCO (POLYCYSTIC OVARIES): ICD-10-CM

## 2018-06-09 DIAGNOSIS — F41.8 PERFORMANCE ANXIETY: ICD-10-CM

## 2018-06-11 RX ORDER — PROPRANOLOL HYDROCHLORIDE 20 MG/1
TABLET ORAL
Qty: 30 TAB | Refills: 0 | Status: SHIPPED | OUTPATIENT
Start: 2018-06-11 | End: 2020-02-20 | Stop reason: ALTCHOICE

## 2018-06-11 RX ORDER — DROSPIRENONE AND ETHINYL ESTRADIOL 0.02-3(28)
1 KIT ORAL DAILY
Qty: 84 TAB | Refills: 4 | Status: SHIPPED | OUTPATIENT
Start: 2018-06-11 | End: 2019-07-16 | Stop reason: SDUPTHER

## 2018-09-25 ENCOUNTER — TELEPHONE (OUTPATIENT)
Dept: INTERNAL MEDICINE CLINIC | Age: 33
End: 2018-09-25

## 2018-09-25 DIAGNOSIS — E66.9 OBESITY (BMI 30.0-34.9): Primary | ICD-10-CM

## 2018-10-09 ENCOUNTER — APPOINTMENT (OUTPATIENT)
Dept: NUTRITION | Age: 33
End: 2018-10-09

## 2019-02-05 ENCOUNTER — OFFICE VISIT (OUTPATIENT)
Dept: INTERNAL MEDICINE CLINIC | Age: 34
End: 2019-02-05

## 2019-02-05 VITALS
BODY MASS INDEX: 33.33 KG/M2 | HEART RATE: 94 BPM | RESPIRATION RATE: 20 BRPM | TEMPERATURE: 98 F | WEIGHT: 225 LBS | SYSTOLIC BLOOD PRESSURE: 131 MMHG | HEIGHT: 69 IN | OXYGEN SATURATION: 98 % | DIASTOLIC BLOOD PRESSURE: 89 MMHG

## 2019-02-05 DIAGNOSIS — E55.9 VITAMIN D DEFICIENCY: ICD-10-CM

## 2019-02-05 DIAGNOSIS — Z00.00 ANNUAL PHYSICAL EXAM: Primary | ICD-10-CM

## 2019-02-05 NOTE — PROGRESS NOTES
Subjective:   34 y.o. female for Well Woman Check. No LMP recorded. Social History: single partner, contraception - OCP (Oral Contraceptive Pills). Pertinent past medical hstory: migraines, no history of HTN, DVT, CAD, DM, liver disease,  or smoking. Patient states she has been doing well. Now participating in weight watchers. She stats she is training to do the 10 k in the spring. Migraines are doing better. She would still like to be able to see the nutritionist. She has not seen her as of yet because of her insurance. She does have different insurance now. Patient Active Problem List    Diagnosis Date Noted    Performance anxiety 05/22/2014    Elevated cholesterol 05/22/2014     No Known Allergies     ROS:  Feeling well. No dyspnea or chest pain on exertion. No abdominal pain, change in bowel habits, black or bloody stools. No urinary tract symptoms. GYN ROS: normal menses, no abnormal bleeding, pelvic pain or discharge, no breast pain or new or enlarging lumps on self exam. No neurological complaints. Objective:     Visit Vitals  /89   Pulse 94   Temp 98 °F (36.7 °C) (Oral)   Resp 20   Ht 5' 9\" (1.753 m)   Wt 225 lb (102.1 kg)   SpO2 98%   BMI 33.23 kg/m²     The patient appears well, alert, oriented x 3, in no distress. ENT normal.  Neck supple. No adenopathy or thyromegaly. TRENT. Lungs are clear, good air entry, no wheezes, rhonchi or rales. S1 and S2 normal, no murmurs, regular rate and rhythm. Abdomen soft without tenderness, guarding, mass or organomegaly. Extremities show no edema, normal peripheral pulses. Neurological is normal, no focal findings. BREAST EXAM: breasts appear normal, no suspicious masses, no skin or nipple changes or axillary nodes    PELVIC EXAM: examination not indicated    Assessment/Plan:   well woman  additional lab tests per orders  return annually or prn  Diagnoses and all orders for this visit:    1.  Annual physical exam  -     CBC WITH AUTOMATED DIFF  - METABOLIC PANEL, COMPREHENSIVE  -     TSH 3RD GENERATION  -     LIPID PANEL  -     VITAMIN D, 25 HYDROXY    2. Vitamin D deficiency  -     CBC WITH AUTOMATED DIFF  -     METABOLIC PANEL, COMPREHENSIVE  -     TSH 3RD GENERATION  -     LIPID PANEL  -     VITAMIN D, 25 HYDROXY      Patient states she got a flu shot at [facility]. I will try to get this so I can update her chart. I will check into to seeing if she can get an appointment with Cesia Jonas. Continue to exercise regularly and watch diet. She will be contact with the results of her labs once back. All this discussed with the patient and she understands and agrees. [place]

## 2019-02-13 LAB
25(OH)D3+25(OH)D2 SERPL-MCNC: 23.7 NG/ML (ref 30–100)
ALBUMIN SERPL-MCNC: 3.9 G/DL (ref 3.5–5.5)
ALBUMIN/GLOB SERPL: 1.3 {RATIO} (ref 1.2–2.2)
ALP SERPL-CCNC: 60 IU/L (ref 39–117)
ALT SERPL-CCNC: 18 IU/L (ref 0–32)
AST SERPL-CCNC: 15 IU/L (ref 0–40)
BASOPHILS # BLD AUTO: 0 X10E3/UL (ref 0–0.2)
BASOPHILS NFR BLD AUTO: 0 %
BILIRUB SERPL-MCNC: 0.2 MG/DL (ref 0–1.2)
BUN SERPL-MCNC: 10 MG/DL (ref 6–20)
BUN/CREAT SERPL: 16 (ref 9–23)
CALCIUM SERPL-MCNC: 9.3 MG/DL (ref 8.7–10.2)
CHLORIDE SERPL-SCNC: 104 MMOL/L (ref 96–106)
CHOLEST SERPL-MCNC: 341 MG/DL (ref 100–199)
CO2 SERPL-SCNC: 18 MMOL/L (ref 20–29)
COMMENT, 011824: ABNORMAL
CREAT SERPL-MCNC: 0.64 MG/DL (ref 0.57–1)
EOSINOPHIL # BLD AUTO: 0 X10E3/UL (ref 0–0.4)
EOSINOPHIL NFR BLD AUTO: 0 %
ERYTHROCYTE [DISTWIDTH] IN BLOOD BY AUTOMATED COUNT: 13.6 % (ref 12.3–15.4)
GLOBULIN SER CALC-MCNC: 2.9 G/DL (ref 1.5–4.5)
GLUCOSE SERPL-MCNC: 85 MG/DL (ref 65–99)
HCT VFR BLD AUTO: 37.8 % (ref 34–46.6)
HDLC SERPL-MCNC: 70 MG/DL
HGB BLD-MCNC: 12.7 G/DL (ref 11.1–15.9)
IMM GRANULOCYTES # BLD AUTO: 0 X10E3/UL (ref 0–0.1)
IMM GRANULOCYTES NFR BLD AUTO: 0 %
INTERPRETATION, 910389: NORMAL
LDLC SERPL CALC-MCNC: 234 MG/DL (ref 0–99)
LYMPHOCYTES # BLD AUTO: 2.6 X10E3/UL (ref 0.7–3.1)
LYMPHOCYTES NFR BLD AUTO: 34 %
MCH RBC QN AUTO: 28.5 PG (ref 26.6–33)
MCHC RBC AUTO-ENTMCNC: 33.6 G/DL (ref 31.5–35.7)
MCV RBC AUTO: 85 FL (ref 79–97)
MONOCYTES # BLD AUTO: 0.5 X10E3/UL (ref 0.1–0.9)
MONOCYTES NFR BLD AUTO: 7 %
NEUTROPHILS # BLD AUTO: 4.5 X10E3/UL (ref 1.4–7)
NEUTROPHILS NFR BLD AUTO: 59 %
PLATELET # BLD AUTO: 332 X10E3/UL (ref 150–379)
POTASSIUM SERPL-SCNC: 4.1 MMOL/L (ref 3.5–5.2)
PROT SERPL-MCNC: 6.8 G/DL (ref 6–8.5)
RBC # BLD AUTO: 4.46 X10E6/UL (ref 3.77–5.28)
SODIUM SERPL-SCNC: 143 MMOL/L (ref 134–144)
TRIGL SERPL-MCNC: 187 MG/DL (ref 0–149)
TSH SERPL DL<=0.005 MIU/L-ACNC: 2.09 UIU/ML (ref 0.45–4.5)
VLDLC SERPL CALC-MCNC: 37 MG/DL (ref 5–40)
WBC # BLD AUTO: 7.7 X10E3/UL (ref 3.4–10.8)

## 2019-02-14 ENCOUNTER — TELEPHONE (OUTPATIENT)
Dept: INTERNAL MEDICINE CLINIC | Age: 34
End: 2019-02-14

## 2019-02-14 RX ORDER — ATORVASTATIN CALCIUM 20 MG/1
20 TABLET, FILM COATED ORAL DAILY
Qty: 30 TAB | Refills: 5 | Status: SHIPPED | OUTPATIENT
Start: 2019-02-14 | End: 2019-04-20 | Stop reason: SDUPTHER

## 2019-02-14 NOTE — PROGRESS NOTES
Writer contacted patient to inform of lab results and inquiry Cleo, patient verbalized understanding and states she will need a refill, but she had not been taking the cholesterol medication.

## 2019-02-14 NOTE — TELEPHONE ENCOUNTER
----- Message from Didier Hayes LPN sent at 0/52/3381  1:50 PM EST -----  Writer contacted patient to inform of lab results and inquiry Cleo, patient verbalized understanding and states she will need a refill, but she had not been taking the cholesterol medication.

## 2019-02-14 NOTE — PROGRESS NOTES
Please let the patient know cholesterol is to high. I know she was on cholesterol medication in the past. I believe she stopped it. If this is correct she should go back on the medication. I can send it to the pharmacy. Vitamin d is just a little low. Advise her to take a daily supplement. All other labs are good.  thanks

## 2019-03-12 ENCOUNTER — TELEPHONE (OUTPATIENT)
Dept: INTERNAL MEDICINE CLINIC | Age: 34
End: 2019-03-12

## 2019-03-12 NOTE — TELEPHONE ENCOUNTER
Please make print a copy of the referral that I placed to the nutritionist. Please fax it to [facility] office thank you all. I am not sure the time of her appointment.

## 2019-03-13 ENCOUNTER — HOSPITAL ENCOUNTER (OUTPATIENT)
Dept: NUTRITION | Age: 34
Discharge: HOME | End: 2019-03-13
Payer: SELF-PAY

## 2019-03-13 PROCEDURE — 97802 MEDICAL NUTRITION INDIV IN: CPT | Performed by: DIETITIAN, REGISTERED

## 2019-03-14 NOTE — PROGRESS NOTES
[address], [facility], [address] [place] [address]  Phone: [phone] Fax: [phone]     Nutrition Assessment - Medical Nutrition Therapy   Outpatient Initial Evaluation         Patient Name: Kinjal Livingston : 1985   Treatment Diagnosis: obesity   Referral Source: Josephine Villagomez [facility]): 3/13/2019     Gender: female Age: 34 y.o. Ht: 69 in Wt:  223.2 lb  kg   BMI: 33.9 RMR   Male  RMR Female [id number]   Anthropometrics Assessment: Per BMI, pt is considered obese. Moderate abdominal adiposity is evident based on visual observation. Past Medical History includes: High Cholesterol, PCOS     Pertinent Medications:   Atorvastatin, Flonase, Fiber Gummy (Nature Made) , Vitamin D   Biochemical Data:   No results found for: HBA1C, HGBE8, UIV0TOPL, FMB4ASMQ  Lab Results   Component Value Date/Time    Cholesterol, total 341 (H) 2019 08:56 AM    HDL Cholesterol 70 2019 08:56 AM    LDL, calculated 234 (H) 2019 08:56 AM    VLDL, calculated 37 2019 08:56 AM    Triglyceride 187 (H) 2019 08:56 AM     Lab Results   Component Value Date/Time    ALT (SGPT) 18 2019 08:56 AM    AST (SGOT) 15 2019 08:56 AM    Alk. phosphatase 60 2019 08:56 AM    Bilirubin, total 0.2 2019 08:56 AM     Lab Results   Component Value Date/Time    Creatinine 0.64 2019 08:56 AM     Lab Results   Component Value Date/Time    BUN 10 2019 08:56 AM     No results found for: MCACR, MCA1, MCA2, MCA3, MCAU, MCAU2, MCALPOCT     Nutrition Diagnosis Excessive energy intake R/T eating past full and high calorie foods consumed out on weekends AEB pt account and BMI > 30     Subjective/Assessment: Pt is a 29yo female here today for help with weight loss. She notes she tends to gain weight when extremely happy or sad in life.  She was able in the psat to loose about 20# on her own through smaller meals. She notes family hx of obesity and notes residual poor eating habits from childhood. She currently exercise 2-4 times per week and has been for more than 3 years. She and her boyfriend are trying to be healthier together. Current Eating Patterns: Sugar cravings. Issues with portion size. Hx of eating past full (improved, but still occurs sometimes). Skips meals when nervous. Used MFP in the past but found it hard to remember to log and avoided logging things she did not want to see she had eaten. B- skips. Previously coffee at [employer]. Now splenda with tea  L- chicken 3-4 oz with green beans, strawberrrries, whip cream (2 tbsp), yoplait red top  S- large apple with 3-4 tbsp pb  Gym   D- lean cuisine if no time to cook, sugar free pudding (2 if hungry)  Issues with eating sweets/chips if they are in the house  Weekends feels she has less control. Eating out 2 meals with boyfriend and others. Chick jaida A, sweet tea, italian, etc. Does not eat past full. Difficulty with bread and pastas. Estimate Needs   Calories: 1800 Protein: 90 Carbs: 225 Fat: 60   Kcal/day  g/day  g/day  g/day        percent: 20  50  30               Education & Recommendations provided: Educated pt on the basics of weight loss and the rationale for dietary modifications and increased activity. Educated pt on carbohydrate food sources, protein sources, label reading, meal timing, and appropriate serving sizes. Encouraged pt to avoid sugary beverages. Created meal plan with pt using Meal Builder. Encouraged limiting fried food items if eating out.    Handouts Provided: []  Carbohydrates  []  Protein  []  Fiber  [x]  Serving Sizes  [x]  Meal and Snack Ideas  []  Food Journals []  Diabetes  []  Cholesterol  []  Sodium  [x]  Gen Nutr Guidelines  []  SBGM Guidelines  [x]  Others:Meal builder   Information Reviewed with: pt   Readiness to Change Stage: []  Pre-contemplative    [] Contemplative  [x]  Preparation               []  Action                  []  Maintenance   Potential Barriers to Learning: []  Decline in memory    []  Language barrier   []  Other:  [x]  Emotional                  []  Limited mobility  [x]  Lack of motivation     [] Vision, hearing or cognitive impairment   Expected Compliance: Good / Annemarieamae Fuchs / Poor due to     Nutritional Goal - To promote lifestyle changes to result in:    []  Weight loss  []  Improved diabetic control  []  Decreased cholesterol levels  []  Decreased blood pressure  []  Weight maintenance []  Preventing any interactions associated with food allergies  []  Adequate weight gain toward goal weight  []  Other:        Patient Goals:  SMART goals -improve consistency of intake by following Meal Builder provided    - continue exercise routine    - decrease fried items when eating out.  Choose grilled instead   Total Treatment Time: 60min   Dietitian Signature: Jose Antonio Beckett MS RD Date: 3/13/2019   Follow-up: 4 weeks Time: 3:08 PM

## 2019-04-10 ENCOUNTER — HOSPITAL ENCOUNTER (OUTPATIENT)
Dept: NUTRITION | Age: 34
Discharge: HOME | End: 2019-04-10
Payer: COMMERCIAL

## 2019-04-10 PROCEDURE — 97803 MED NUTRITION INDIV SUBSEQ: CPT | Performed by: DIETITIAN, REGISTERED

## 2019-04-10 NOTE — PROGRESS NOTES
NUTRITION - FOLLOW-UP TREATMENT NOTE  Patient Name: Jay Fletcher         Date: 4/10/2019  : 1985    YES Patient  Verified  Diagnosis: obesity   In time:   4:00pm             Out time:   4:30pm   Total Treatment Time (min):   30     SUBJECTIVE/ASSESSMENT    Changes in medication or medical history? Any new allergies, surgeries or procedures?    /NO    If yes, update Summary List   Pt has returned for follow up. She has done well with breakfast and lunch and packing ahead. Struggles most with dinner. Frozen dinners remain the most convenient after home late from gym. Worked with pt to identify 5-6 different quick dinner options to make at home instead of frozen meals. They have reduced the amount of times eating out to 2 times per week. Still choosing high calorie foods such as fried items and larger portions when eating out. She is pleased with the changes. Overall energy level improved. No difference in clothing. Provided positive reinforcement for changes made. She expressed comprehension, high motivation, and compliance is expected. Current Wt: 226.0 Previous Wt: 223.2 Wt Change: +2.8     Achievement of Goals: -improve consistency of intake by following Meal Builder provided- met. Using MFP to identify choices more than meal builder   - continue exercise routine-met and increased to 5 days per week.   - decrease fried items when eating out. Choose grilled -eating out less often (2x/wk). Continues choosing fried items. Goal revised.           Patient Education:  [x]  Review current plan with patient   []  Other:    Handouts/  Information Provided: []  Carbohydrates  []  Protein  []  Fiber  []  Serving Sizes  []  Fluids  []  General guidelines []  Diabetes  []  Cholesterol  []  Sodium  []  SBGM  []  Food Journals  []  Others:      New Patient Goals: -make dinner at home 3 night sper week  -when eating out: choose something only from the \"healthy section\"   -choose non food rewards for positive changes: tv, buy clothing, get nails done, listen to music. PLAN    [x]  Continue on current plan []  Follow-up PRN   []  Discharge due to :    [x]  Next appt:  May 8 @ 4:30pm     Dietitian: Jaycee Thao MS RD    Date: 4/10/2019 Time: 6:46 PM

## 2019-04-22 RX ORDER — ATORVASTATIN CALCIUM 20 MG/1
20 TABLET, FILM COATED ORAL DAILY
Qty: 30 TAB | Refills: 5 | Status: SHIPPED | OUTPATIENT
Start: 2019-04-22 | End: 2019-07-16 | Stop reason: SDUPTHER

## 2019-05-08 ENCOUNTER — APPOINTMENT (OUTPATIENT)
Dept: NUTRITION | Age: 34
End: 2019-05-08

## 2019-05-21 ENCOUNTER — OFFICE VISIT (OUTPATIENT)
Dept: INTERNAL MEDICINE CLINIC | Age: 34
End: 2019-05-21

## 2019-05-21 VITALS
TEMPERATURE: 98.3 F | RESPIRATION RATE: 20 BRPM | SYSTOLIC BLOOD PRESSURE: 122 MMHG | DIASTOLIC BLOOD PRESSURE: 86 MMHG | HEIGHT: 69 IN | WEIGHT: 221 LBS | BODY MASS INDEX: 32.73 KG/M2 | OXYGEN SATURATION: 98 % | HEART RATE: 86 BPM

## 2019-05-21 DIAGNOSIS — G43.001 MIGRAINE WITHOUT AURA AND WITH STATUS MIGRAINOSUS, NOT INTRACTABLE: ICD-10-CM

## 2019-05-21 DIAGNOSIS — F41.9 ANXIETY: Primary | ICD-10-CM

## 2019-05-21 RX ORDER — ALPRAZOLAM 0.25 MG/1
TABLET ORAL
Qty: 30 TAB | Refills: 0 | Status: SHIPPED | OUTPATIENT
Start: 2019-05-21 | End: 2020-01-09

## 2019-05-21 RX ORDER — ZINC GLUCONATE 10 MG
250 LOZENGE ORAL DAILY
Qty: 30 TAB | Refills: 1 | Status: ON HOLD | OUTPATIENT
Start: 2019-05-21 | End: 2021-09-23

## 2019-05-21 NOTE — PROGRESS NOTES
Patient attended a social event and had what she thinks was a panic attack. Looking for something occasional for events, crowded places, etc.... Eileen Jenkins Wants to discuss migraines/sinus issues,

## 2019-05-21 NOTE — PROGRESS NOTES
Ning Brian is a 34 y.o. female. HPI  Patient presents to the office for two concerns. She reports recently she was out for a party that was taking place at a bar. She reports she does not like going out. She was feeling very uncomfortable from the beginning. She states she was sitting at the end of the bar with her friend. As the evening processed she felt like her friend was paying less attention to her and she had her back to her more. She could feel herself getting worked up and so she got up and went around the corner. She started to feel like she was hyperventilating for about 10 minutes. Her friend did come and speak to her about this. She has not seen a therapist in the past about this. She reports this does not happen very often. She is concerned however because she will be going to her boyfriends sister wedding soon. Her other concern today is about her headaches. She states she will normally get a headache around her cycle. Recently she has been having more frequent headaches that is debilitating. She states the headache is usually on the right side and throbbing. Excedrin does help but everything else that has been tried really does not help. Review of Systems   Neurological: Positive for headaches. Negative for dizziness, sensory change, speech change, focal weakness and weakness. Psychiatric/Behavioral: Negative for depression and suicidal ideas. The patient is nervous/anxious. The patient does not have insomnia. Blood pressure 122/86, pulse 86, temperature 98.3 °F (36.8 °C), temperature source Oral, resp. rate 20, height 5' 9\" (1.753 m), weight 221 lb (100.2 kg), SpO2 98 %. Physical Exam   Constitutional: She appears well-developed and well-nourished. No distress. Neurological: No cranial nerve deficit. Coordination normal.   Psychiatric: She has a normal mood and affect.  Her behavior is normal. Judgment and thought content normal. ASSESSMENT and PLAN  Diagnoses and all orders for this visit:    1. Anxiety  -     ALPRAZolam (XANAX) 0.25 mg tablet; Take one to two tablets every 8 hours if needed  -     REFERRAL TO PSYCHOLOGY    2. Migraine without aura and with status migrainosus, not intractable  -     magnesium 250 mg tab; Take 1 Tab by mouth daily. we talked at length about her anxiety. I have explained to her that xanax is not intended for long term or frequent use. I am okay with prescribing this medication for her to use sporadically during those times that she feels she need it. We also talked about seeing a therapist that can help her develop strategies to help during those times that she start to feel anxious and overwhelmed. I have prescribed a trial of magnesium to see if this helps her headaches. If this does not help, then she will need to see the neurologist. All this was discussed with the patient and she understands and agrees.

## 2019-07-16 DIAGNOSIS — G43.821 MENSTRUAL MIGRAINE WITH STATUS MIGRAINOSUS, NOT INTRACTABLE: ICD-10-CM

## 2019-07-16 DIAGNOSIS — E28.2 PCO (POLYCYSTIC OVARIES): ICD-10-CM

## 2019-07-16 RX ORDER — FROVATRIPTAN SUCCINATE 2.5 MG/1
TABLET, FILM COATED ORAL
Qty: 7 TAB | Refills: 3 | Status: SHIPPED | OUTPATIENT
Start: 2019-07-16 | End: 2019-09-11 | Stop reason: SDUPTHER

## 2019-07-16 RX ORDER — DROSPIRENONE AND ETHINYL ESTRADIOL 0.02-3(28)
1 KIT ORAL DAILY
Qty: 84 TAB | Refills: 4 | Status: SHIPPED | OUTPATIENT
Start: 2019-07-16 | End: 2020-02-20 | Stop reason: ALTCHOICE

## 2019-07-16 RX ORDER — ATORVASTATIN CALCIUM 20 MG/1
20 TABLET, FILM COATED ORAL DAILY
Qty: 30 TAB | Refills: 5 | Status: SHIPPED | OUTPATIENT
Start: 2019-07-16 | End: 2020-02-20 | Stop reason: SDUPTHER

## 2019-07-30 ENCOUNTER — TELEPHONE (OUTPATIENT)
Dept: INTERNAL MEDICINE CLINIC | Age: 34
End: 2019-07-30

## 2019-10-10 ENCOUNTER — TELEPHONE (OUTPATIENT)
Dept: INTERNAL MEDICINE CLINIC | Age: 34
End: 2019-10-10

## 2019-10-10 ENCOUNTER — OFFICE VISIT (OUTPATIENT)
Dept: INTERNAL MEDICINE CLINIC | Age: 34
End: 2019-10-10

## 2019-10-10 VITALS
RESPIRATION RATE: 20 BRPM | WEIGHT: 224 LBS | SYSTOLIC BLOOD PRESSURE: 121 MMHG | TEMPERATURE: 98.6 F | BODY MASS INDEX: 33.18 KG/M2 | HEIGHT: 69 IN | HEART RATE: 80 BPM | OXYGEN SATURATION: 97 % | DIASTOLIC BLOOD PRESSURE: 88 MMHG

## 2019-10-10 DIAGNOSIS — F32.A DEPRESSION, UNSPECIFIED DEPRESSION TYPE: Primary | ICD-10-CM

## 2019-10-10 DIAGNOSIS — F43.21 GRIEVING: ICD-10-CM

## 2019-10-10 RX ORDER — ESCITALOPRAM OXALATE 10 MG/1
10 TABLET ORAL DAILY
Qty: 30 TAB | Refills: 2 | Status: SHIPPED | OUTPATIENT
Start: 2019-10-10 | End: 2020-01-02 | Stop reason: SDUPTHER

## 2019-10-10 NOTE — TELEPHONE ENCOUNTER
Writer contacted patient to inform of new medication sent and other information per Yohan Spencer, patient verbalized understanding and a follow up appointment made for 2 weeks.

## 2019-10-10 NOTE — PROGRESS NOTES
Depression Screen (4). Patient dad  in May, came back from vacation in August the depression started, not excited about anything, don't want to get out of bed. Mom just moved away. Chief Complaint   Patient presents with    Depression     she is a 30y.o. year old female who presents for evalution. She is here because she has not been feeling like herself. She reports her father  in May. She states she was not very close with in. She was sad at first and felt bad for him but she does not think of the loss everyday. She went on vacation and when she got back she states she just did not feel like herself. She states she is not motivated to do anything. She also has a presentation coming up in either November or December and this is causing her stress as well. Her mother has moved about 2 hours away. She states she is okay with this move. She reports she honestly does not know why she is feeling this way. She is seeing a therapist now. She has seen her twice and will be seeing her again in a couple of weeks. Reviewed and agree with Nurse Note and duplicated in this note. Reviewed PmHx, RxHx, FmHx, SocHx, AllgHx and updated and dated in the chart. Review of Systems - negative except as listed above    Objective:     Vitals:    10/10/19 0958 10/10/19 1006   BP: 141/84 121/88   Pulse: 86 80   Resp: 20    Temp: 98.6 °F (37 °C)    TempSrc: Oral    SpO2: 97%    Weight: 224 lb (101.6 kg)    Height: 5' 9\" (1.753 m)      Physical Examination: General appearance - alert, well appearing, and in no distress  Psych- appropriate behavior, not suicidal or homicial    Assessment/ Plan:   Diagnoses and all orders for this visit:    1. Depression, unspecified depression type    2. Grieving         I will try to get in contact with her psychologist. We talked about starting lexapro today for a period of time. I would like for the patient to return in two weeks to see how she is doing.   total encounter time was 25 minutes; 50% of time was spent counseling/coordinating care regarding depression, work stress and grieving. I have discussed the diagnosis with the patient and the intended plan as seen in the above orders. The patient has received an after-visit summary and questions were answered concerning future plans.      Crissy Valdovinos PA-C

## 2019-10-24 ENCOUNTER — OFFICE VISIT (OUTPATIENT)
Dept: INTERNAL MEDICINE CLINIC | Age: 34
End: 2019-10-24

## 2019-10-24 VITALS
WEIGHT: 224 LBS | DIASTOLIC BLOOD PRESSURE: 86 MMHG | BODY MASS INDEX: 33.18 KG/M2 | HEIGHT: 69 IN | TEMPERATURE: 98.2 F | SYSTOLIC BLOOD PRESSURE: 114 MMHG | RESPIRATION RATE: 20 BRPM | HEART RATE: 75 BPM | OXYGEN SATURATION: 98 %

## 2019-10-24 DIAGNOSIS — F32.A DEPRESSION, UNSPECIFIED DEPRESSION TYPE: Primary | ICD-10-CM

## 2019-10-24 DIAGNOSIS — G24.5 EYE TWITCH: ICD-10-CM

## 2019-10-24 NOTE — PROGRESS NOTES
Patient states lexapro is helping, but making her very drowsy. Chief Complaint   Patient presents with    Medication Evaluation     Lexapro     she is a 30y.o. year old female who presents for evalution. She states she is feeling better but medication making her drowsy. She has not tried taking at night yet. No longer seeing the counselor. She states it feels awkward talking to her about things. She is not sure if she was hoping the counselor would give her more of a conversation. Left eye twitch since starting the medication. Not constantly but enough to be annoying. Reviewed and agree with Nurse Note and duplicated in this note. Reviewed PmHx, RxHx, FmHx, SocHx, AllgHx and updated and dated in the chart. Review of Systems - negative except as listed above    Objective:     Vitals:    10/24/19 0854   BP: 114/86   Pulse: 75   Resp: 20   Temp: 98.2 °F (36.8 °C)   TempSrc: Oral   SpO2: 98%   Weight: 224 lb (101.6 kg)   Height: 5' 9\" (1.753 m)     Physical Examination: General appearance - alert, well appearing, and in no distress  Mental status - normal mood, behavior, speech, dress, motor activity, and thought processes    Assessment/ Plan:   Diagnoses and all orders for this visit:    1. Depression, unspecified depression type    2. Eye twitch       I would like the patient to try to take the lexapro at night instead of the daytime. She will let me know if this helps with the drowsiness during the day. Also she will keep me informed about the eye twitching. I am not sure if the patient would benefit from a  or a new counselor. We talked about this today. I have discussed the diagnosis with the patient and the intended plan as seen in the above orders. The patient has received an after-visit summary and questions were answered concerning future plans.      Medication Side Effects and Warnings were discussed with patient: yes  Patient Labs were reviewed and or requested: n/a  Patient Past Records were reviewed and or requested  yes    Cleo Valdovinos PA-C

## 2020-01-02 ENCOUNTER — TELEPHONE (OUTPATIENT)
Dept: INTERNAL MEDICINE CLINIC | Age: 35
End: 2020-01-02

## 2020-01-02 DIAGNOSIS — F32.A DEPRESSION, UNSPECIFIED DEPRESSION TYPE: ICD-10-CM

## 2020-01-02 RX ORDER — ESCITALOPRAM OXALATE 10 MG/1
10 TABLET ORAL DAILY
Qty: 30 TAB | Refills: 0 | Status: SHIPPED | OUTPATIENT
Start: 2020-01-02 | End: 2020-02-20 | Stop reason: ALTCHOICE

## 2020-01-09 DIAGNOSIS — F41.9 ANXIETY: ICD-10-CM

## 2020-01-09 RX ORDER — ALPRAZOLAM 0.25 MG/1
TABLET ORAL
Qty: 30 TAB | Refills: 0 | Status: SHIPPED | OUTPATIENT
Start: 2020-01-09 | End: 2020-03-05 | Stop reason: SDUPTHER

## 2020-02-20 ENCOUNTER — OFFICE VISIT (OUTPATIENT)
Dept: INTERNAL MEDICINE CLINIC | Age: 35
End: 2020-02-20

## 2020-02-20 VITALS
WEIGHT: 238.4 LBS | BODY MASS INDEX: 35.31 KG/M2 | HEART RATE: 96 BPM | RESPIRATION RATE: 20 BRPM | TEMPERATURE: 98.5 F | OXYGEN SATURATION: 99 % | SYSTOLIC BLOOD PRESSURE: 135 MMHG | HEIGHT: 69 IN | DIASTOLIC BLOOD PRESSURE: 89 MMHG

## 2020-02-20 DIAGNOSIS — F41.9 ANXIETY: ICD-10-CM

## 2020-02-20 DIAGNOSIS — E28.2 PCO (POLYCYSTIC OVARIES): ICD-10-CM

## 2020-02-20 DIAGNOSIS — Z01.419 WELL WOMAN EXAM WITH ROUTINE GYNECOLOGICAL EXAM: Primary | ICD-10-CM

## 2020-02-20 DIAGNOSIS — E66.01 SEVERE OBESITY (HCC): ICD-10-CM

## 2020-02-20 DIAGNOSIS — Z01.419 WELL WOMAN EXAM WITH ROUTINE GYNECOLOGICAL EXAM: ICD-10-CM

## 2020-02-20 NOTE — PROGRESS NOTES
Subjective:   30 y.o. female for Well Woman Check. Patient's last menstrual period was 01/20/2020 (exact date). Since the last visit the patient has gotten engaged. They plan to have their wedding in September. Patient reports she has also stopped her birth control pills. She stopped the birth control pills back in January. She reports that she has a history of PCO and wants to know what all she should do to help or prepare for pregnancy. The patient reports that she stopped her birth control a week before the placebo pills. When she did this she had a light cycle. And then a week or so after that she had a normal cycle. She reports she has not had a cycle as of yet. She did take a pregnancy test last week to make sure she was not pregnant and this was negative. She reports she has been having some breast tenderness. The patient reports that she is no longer on her antidepressant medicine and feels that depression is not as much of a problem as anxiety. She recently went on a trip to [place] with her friends and felt anxiety during that time. The patient reports she still not feeling very motivated most days although she has reached out for a . Social History: single partner, contraception - none. Pertinent past medical hstory: no history of HTN, DVT, CAD, DM, liver disease, migraines or smoking. Patient Active Problem List    Diagnosis Date Noted    Severe obesity ([facility] [address] 02/20/2020    Obesity     Performance anxiety 05/22/2014    Elevated cholesterol 05/22/2014     Current Outpatient Medications   Medication Sig Dispense Refill    ALPRAZolam (XANAX) 0.25 mg tablet TAKE 1 TO 2 TABLETS BY MOUTH EVERY 8 HOURS IF  NEEDED 30 Tab 0    frovatriptan (FROVA) 2.5 mg tablet Take one tablet daily two days before start of cycle and continue one tablet daily for 5 additional days 30 Tab 1    magnesium 250 mg tab Take 1 Tab by mouth daily.  30 Tab 1    atorvastatin (LIPITOR) 20 mg tablet Take 1 Tab by mouth daily. 30 Tab 5    fluticasone (FLONASE) 50 mcg/actuation nasal spray 2 sprays by Both Nostrils route daily. 1 Bottle 2     No Known Allergies     ROS:  Feeling well. No dyspnea or chest pain on exertion. No abdominal pain, change in bowel habits, black or bloody stools. No urinary tract symptoms. GYN ROS: she complains of menstrual cycle has been irregular since stopping the birth control. No neurological complaints. Objective:     Visit Vitals  /89   Pulse 96   Temp 98.5 °F (36.9 °C) (Oral)   Resp 20   Ht 5' 9\" (1.753 m)   Wt 238 lb 6.4 oz (108.1 kg)   LMP 01/20/2020 (Exact Date) Comment: regular   SpO2 99%   BMI 35.21 kg/m²     The patient appears well, alert, oriented x 3, in no distress. ENT normal.  Neck supple. No adenopathy or thyromegaly. TRENT. Lungs are clear, good air entry, no wheezes, rhonchi or rales. S1 and S2 normal, no murmurs, regular rate and rhythm. Abdomen soft without tenderness, guarding, mass or organomegaly. Extremities show no edema, normal peripheral pulses. Neurological is normal, no focal findings. BREAST EXAM: breasts appear normal, no suspicious masses, no skin or nipple changes or axillary nodes    PELVIC EXAM: VULVA: normal appearing vulva with no masses, tenderness or lesions, VAGINA: normal appearing vagina with normal color and discharge, no lesions, CERVIX: normal appearing cervix without discharge or lesions, UTERUS: uterus is normal size, shape, consistency and nontender, ADNEXA: normal adnexa in size, nontender and no masses    Assessment/Plan:   well woman  pap smear  additional lab tests per orders  return annually or prn  Diagnoses and all orders for this visit:    1. Well woman exam with routine gynecological exam  -     CBC WITH AUTOMATED DIFF; Future  -     LIPID PANEL; Future  -     METABOLIC PANEL, COMPREHENSIVE; Future  -     TSH 3RD GENERATION;  Future  -     VITAMIN D, 25 HYDROXY; Future  -     PAP LB, HPV RFX HPV 90&53,85([id number])    2. Anxiety    3. PCO (polycystic ovaries)  -     REFERRAL TO OBSTETRICS AND GYNECOLOGY    4. Severe obesity ([address]    . Patient I spoke at length about her anxiety and depression. When talking about this she now realizes and admits that she started feeling this way around the time she started her current job. She reports that her boss is not nice and some of the people that she works with are not nice. She reports that in the evening when she gets home she is drinking. I told her that I would reach out to our Pharm. D. in reference to anxiety medicine but they usually do or can cause weight gain. I know that this is something that she is very concerned about. Meanwhile I have given her a referral to see Dr. Esha Ramires in reference to her PCO and preparing for pregnancy. I will contact the patient with the results of her labs and further suggestions in reference to her anxiety.

## 2020-02-24 LAB
CYTOLOGIST CVX/VAG CYTO: NORMAL
CYTOLOGY CVX/VAG DOC CYTO: NORMAL
DX ICD CODE: NORMAL
HPV I/H RISK 1 DNA CVX QL PROBE+SIG AMP: NEGATIVE
Lab: NORMAL
OTHER STN SPEC: NORMAL
STAT OF ADQ CVX/VAG CYTO-IMP: NORMAL

## 2020-02-28 LAB
25(OH)D3+25(OH)D2 SERPL-MCNC: 37.8 NG/ML (ref 30–100)
ALBUMIN SERPL-MCNC: 4.2 G/DL (ref 3.8–4.8)
ALBUMIN/GLOB SERPL: 1.8 {RATIO} (ref 1.2–2.2)
ALP SERPL-CCNC: 63 IU/L (ref 39–117)
ALT SERPL-CCNC: 30 IU/L (ref 0–32)
AST SERPL-CCNC: 22 IU/L (ref 0–40)
BASOPHILS # BLD AUTO: 0.1 X10E3/UL (ref 0–0.2)
BASOPHILS NFR BLD AUTO: 1 %
BILIRUB SERPL-MCNC: 0.2 MG/DL (ref 0–1.2)
BUN SERPL-MCNC: 13 MG/DL (ref 6–20)
BUN/CREAT SERPL: 20 (ref 9–23)
CALCIUM SERPL-MCNC: 8.9 MG/DL (ref 8.7–10.2)
CHLORIDE SERPL-SCNC: 105 MMOL/L (ref 96–106)
CHOLEST SERPL-MCNC: 291 MG/DL (ref 100–199)
CO2 SERPL-SCNC: 22 MMOL/L (ref 20–29)
COMMENT, 011824: ABNORMAL
CREAT SERPL-MCNC: 0.64 MG/DL (ref 0.57–1)
EOSINOPHIL # BLD AUTO: 0.1 X10E3/UL (ref 0–0.4)
EOSINOPHIL NFR BLD AUTO: 2 %
ERYTHROCYTE [DISTWIDTH] IN BLOOD BY AUTOMATED COUNT: 13.9 % (ref 11.7–15.4)
GLOBULIN SER CALC-MCNC: 2.3 G/DL (ref 1.5–4.5)
GLUCOSE SERPL-MCNC: 90 MG/DL (ref 65–99)
HCT VFR BLD AUTO: 39.7 % (ref 34–46.6)
HDLC SERPL-MCNC: 63 MG/DL
HGB BLD-MCNC: 13.1 G/DL (ref 11.1–15.9)
IMM GRANULOCYTES # BLD AUTO: 0 X10E3/UL (ref 0–0.1)
IMM GRANULOCYTES NFR BLD AUTO: 0 %
INTERPRETATION, 910389: NORMAL
LDLC SERPL CALC-MCNC: 209 MG/DL (ref 0–99)
LYMPHOCYTES # BLD AUTO: 2.1 X10E3/UL (ref 0.7–3.1)
LYMPHOCYTES NFR BLD AUTO: 36 %
MCH RBC QN AUTO: 28.1 PG (ref 26.6–33)
MCHC RBC AUTO-ENTMCNC: 33 G/DL (ref 31.5–35.7)
MCV RBC AUTO: 85 FL (ref 79–97)
MONOCYTES # BLD AUTO: 0.4 X10E3/UL (ref 0.1–0.9)
MONOCYTES NFR BLD AUTO: 7 %
NEUTROPHILS # BLD AUTO: 3.1 X10E3/UL (ref 1.4–7)
NEUTROPHILS NFR BLD AUTO: 54 %
PLATELET # BLD AUTO: 355 X10E3/UL (ref 150–450)
POTASSIUM SERPL-SCNC: 4.5 MMOL/L (ref 3.5–5.2)
PROT SERPL-MCNC: 6.5 G/DL (ref 6–8.5)
RBC # BLD AUTO: 4.67 X10E6/UL (ref 3.77–5.28)
SODIUM SERPL-SCNC: 139 MMOL/L (ref 134–144)
TRIGL SERPL-MCNC: 97 MG/DL (ref 0–149)
TSH SERPL DL<=0.005 MIU/L-ACNC: 2.67 UIU/ML (ref 0.45–4.5)
VLDLC SERPL CALC-MCNC: 19 MG/DL (ref 5–40)
WBC # BLD AUTO: 5.8 X10E3/UL (ref 3.4–10.8)

## 2020-03-05 ENCOUNTER — TELEPHONE (OUTPATIENT)
Dept: INTERNAL MEDICINE CLINIC | Age: 35
End: 2020-03-05

## 2020-03-05 DIAGNOSIS — G43.821 MENSTRUAL MIGRAINE WITH STATUS MIGRAINOSUS, NOT INTRACTABLE: ICD-10-CM

## 2020-03-05 DIAGNOSIS — F41.9 ANXIETY: ICD-10-CM

## 2020-03-05 DIAGNOSIS — E78.5 HYPERLIPIDEMIA, UNSPECIFIED HYPERLIPIDEMIA TYPE: ICD-10-CM

## 2020-03-05 DIAGNOSIS — F32.A DEPRESSION, UNSPECIFIED DEPRESSION TYPE: Primary | ICD-10-CM

## 2020-03-05 RX ORDER — FROVATRIPTAN SUCCINATE 2.5 MG/1
TABLET, FILM COATED ORAL
Qty: 30 TAB | Refills: 1 | Status: SHIPPED | OUTPATIENT
Start: 2020-03-05 | End: 2020-10-11 | Stop reason: SDUPTHER

## 2020-03-05 RX ORDER — ATORVASTATIN CALCIUM 20 MG/1
20 TABLET, FILM COATED ORAL DAILY
Qty: 30 TAB | Refills: 5 | Status: ON HOLD | OUTPATIENT
Start: 2020-03-05 | End: 2021-09-23

## 2020-03-05 RX ORDER — BUPROPION HYDROCHLORIDE 150 MG/1
150 TABLET ORAL
Qty: 30 TAB | Refills: 3 | Status: ON HOLD | OUTPATIENT
Start: 2020-03-05 | End: 2021-09-23

## 2020-03-05 RX ORDER — ALPRAZOLAM 0.25 MG/1
TABLET ORAL
Qty: 30 TAB | Refills: 0 | Status: SHIPPED | OUTPATIENT
Start: 2020-03-05 | End: 2020-06-20 | Stop reason: SDUPTHER

## 2020-05-13 ENCOUNTER — TELEPHONE (OUTPATIENT)
Dept: OBGYN CLINIC | Age: 35
End: 2020-05-13

## 2020-05-13 NOTE — TELEPHONE ENCOUNTER
Call received at 1:10PM      30year old patient last seen in the office on  10/4/2016      [facility] calling about records for the patient. This nurse advised of the need to have the patient to sign permission to release form prior to records being sent.

## 2020-06-20 DIAGNOSIS — F41.9 ANXIETY: ICD-10-CM

## 2020-06-22 RX ORDER — ALPRAZOLAM 0.25 MG/1
TABLET ORAL
Qty: 30 TAB | Refills: 0 | Status: SHIPPED | OUTPATIENT
Start: 2020-06-22 | End: 2020-10-11 | Stop reason: SDUPTHER

## 2020-06-30 ENCOUNTER — VIRTUAL VISIT (OUTPATIENT)
Dept: INTERNAL MEDICINE CLINIC | Age: 35
End: 2020-06-30

## 2020-06-30 DIAGNOSIS — F43.9 STRESS: ICD-10-CM

## 2020-06-30 DIAGNOSIS — F41.9 ANXIETY: Primary | ICD-10-CM

## 2020-06-30 RX ORDER — METFORMIN HYDROCHLORIDE 1000 MG/1
1000 TABLET ORAL 2 TIMES DAILY
Status: ON HOLD | COMMUNITY
End: 2021-09-23

## 2020-06-30 NOTE — PROGRESS NOTES
Sydnee Rosales is a 30 y.o. female who was seen by synchronous (real-time) audio-video technology on 6/30/2020 for Stress (for the last 3 months)  The patient presents to talk about recent stressors. She reports she feels she has adjusted to working from home. She states some days she just does not feel like doing it however she does not have to see her boss in person. She reports that she has changed her wedding venue and the month. She reports now they will be getting  in August at her fiancé's parents home instead of at a venue  that was scheduled in September. She reports that she does have some stress with planning the wedding and making sure everything goes as she has it in her head. She reports for the past 2 months she has been noticing that she is not able to get the words out all the time to express how she is feeling. The patient also is slowly packing to move. They plan on putting their home on the market in August.  She reports last week she did take her Xanax because she was feeling a little overwhelmed. Assessment & Plan:   Diagnoses and all orders for this visit:    1. Anxiety  -     REFERRAL TO BEHAVIORAL HEALTH    2. Stress  -     REFERRAL TO BEHAVIORAL HEALTH    The patient I spoke at length about some of the stressors that she has in her life right now. I feel that she would benefit from seeing a mental health professional.  She has seen a therapist in the past and reports she just did not click with them. I explained to her that it is not uncommon to not click with the first therapist and sometimes you have to look for a new one. I do feel she would benefit from speaking with someone that could give her some strategies as she moves forward with planning her wedding and juggle work and moving. The patient is open to seeing a mental health professional.  I will also check in with the patient during the week to see how things are going.     I spent at least 25 minutes on this visit with this established patient. 712  Subjective:       Prior to Admission medications    Medication Sig Start Date End Date Taking? Authorizing Provider   metFORMIN (GLUCOPHAGE) 1,000 mg tablet Take 1,000 mg by mouth two (2) times a day. Yes Provider, Historical   ALPRAZolam (XANAX) 0.25 mg tablet TAKE 1 TO 2 TABLETS BY MOUTH EVERY 8 HOURS IF  NEEDED 6/22/20  Yes Cleo Valdovinos PA-C   frovatriptan (FROVA) 2.5 mg tablet Take one tablet daily two days before start of cycle and continue one tablet daily for 5 additional days 3/5/20  Yes Cleo Valdovinos PA-C   magnesium 250 mg tab Take 1 Tab by mouth daily. 5/21/19  Yes Cleo Valdovinos PA-MARSHAL   fluticasone (FLONASE) 50 mcg/actuation nasal spray 2 sprays by Both Nostrils route daily. 11/24/14  Yes Cleo Valdovinos PA-C   atorvastatin (LIPITOR) 20 mg tablet Take 1 Tab by mouth daily. 3/5/20   Cleo Valdovinos PA-C   buPROPion XL (WELLBUTRIN XL) 150 mg tablet Take 1 Tab by mouth every morning. 3/5/20   Hunter Valdovinos PA-C     Patient Active Problem List    Diagnosis Date Noted    Severe obesity ([facility] [address] 02/20/2020    Obesity     Performance anxiety 05/22/2014    Elevated cholesterol 05/22/2014     Current Outpatient Medications   Medication Sig Dispense Refill    metFORMIN (GLUCOPHAGE) 1,000 mg tablet Take 1,000 mg by mouth two (2) times a day.  ALPRAZolam (XANAX) 0.25 mg tablet TAKE 1 TO 2 TABLETS BY MOUTH EVERY 8 HOURS IF  NEEDED 30 Tab 0    frovatriptan (FROVA) 2.5 mg tablet Take one tablet daily two days before start of cycle and continue one tablet daily for 5 additional days 30 Tab 1    magnesium 250 mg tab Take 1 Tab by mouth daily. 30 Tab 1    fluticasone (FLONASE) 50 mcg/actuation nasal spray 2 sprays by Both Nostrils route daily. 1 Bottle 2    atorvastatin (LIPITOR) 20 mg tablet Take 1 Tab by mouth daily. 30 Tab 5    buPROPion XL (WELLBUTRIN XL) 150 mg tablet Take 1 Tab by mouth every morning.  30 Tab 3     No Known Allergies    ROS    Objective:     Patient-Reported Vitals 6/30/2020   Patient-Reported Weight 233lb   Patient-Reported Systolic  848   Patient-Reported Diastolic 85      General: alert, cooperative, no distress   Mental  status: normal mood, behavior, speech, dress, motor activity, and thought processes, able to follow commands   HENT: NCAT   Neck: no visualized mass   Resp: no respiratory distress   Neuro: no gross deficits   Skin: no discoloration or lesions of concern on visible areas   Psychiatric: normal affect, consistent with stated mood, no evidence of hallucinations     Additional exam findings: We discussed the expected course, resolution and complications of the diagnosis(es) in detail. Medication risks, benefits, costs, interactions, and alternatives were discussed as indicated. I advised her to contact the office if her condition worsens, changes or fails to improve as anticipated. She expressed understanding with the diagnosis(es) and plan. RonnellAtiliokee ISRAEL Barron Narvaez, who was evaluated through a patient-initiated, synchronous (real-time) audio-video encounter, and/or her healthcare decision maker, is aware that it is a billable service, with coverage as determined by her insurance carrier. She provided verbal consent to proceed: Yes, and patient identification was verified. It was conducted pursuant to the emergency declaration under the [address] [place] [address] authority and the [employer] Act. A caregiver was present when appropriate. Ability to conduct physical exam was limited. I was at home. The patient was at home.       Emma Valdovinos PA-C

## 2020-06-30 NOTE — PROGRESS NOTES
1. Have you been to the ER, urgent care clinic since your last visit? Hospitalized since your last visit? No    2. Have you seen or consulted any other health care providers outside of the [address] [place] [address] since your last visit? Include any pap smears or colon screening.  Yes Dr Padmini Ramires    Chief Complaint   Patient presents with    Stress     for the last 3 months

## 2020-07-09 ENCOUNTER — TELEPHONE (OUTPATIENT)
Dept: INTERNAL MEDICINE CLINIC | Age: 35
End: 2020-07-09

## 2020-07-09 NOTE — TELEPHONE ENCOUNTER
Please call Dr. Cathy King office and get Ms. Muller Tena most recent lab results.   Thank you [phone]

## 2020-07-24 NOTE — MR AVS SNAPSHOT
Visit Information Date & Time Provider Department Dept. Phone Encounter #  
 7/6/2017  7:50 AM Felicia Reed PA-C [facility] [phone] [id number] Upcoming Health Maintenance Date Due DTaP/Tdap/Td series (1 - Tdap) 10/5/2008 PAP AKA CERVICAL CYTOLOGY 5/30/2017 INFLUENZA AGE 9 TO ADULT 8/1/2017 Allergies as of 7/6/2017  Review Complete On: 7/6/2017 By: Keli Ndiaye LPN No Known Allergies Current Immunizations  Never Reviewed Name Date Influenza Vaccine 11/12/2015 Not reviewed this visit You Were Diagnosed With   
  
 Codes Comments Well woman exam with routine gynecological exam    -  Primary ICD-10-CM: Z06.224 ICD-9-CM: V72.31   
 PCO (polycystic ovaries)     ICD-10-CM: E28.2 ICD-9-CM: 256.4 Vitals BP Pulse Temp Resp Height(growth percentile) Weight(growth percentile) 109/80 79 98.6 °F (37 °C) (Oral) 20 5' 9\" (1.753 m) 213 lb (96.6 kg) LMP SpO2 BMI OB Status Smoking Status 06/16/2017 (Exact Date) 99% 31.45 kg/m2 Having regular periods Former Smoker Vitals History BMI and BSA Data Body Mass Index Body Surface Area  
 31.45 kg/m 2 2.17 m 2 Preferred Pharmacy Pharmacy Name Phone [facility] [address] Your Updated Medication List  
  
   
This list is accurate as of: 7/6/17  8:26 AM.  Always use your most recent med list.  
  
  
  
  
 atorvastatin 20 mg tablet Commonly known as:  LIPITOR Take 1 Tab by mouth daily. drospirenone-ethinyl estradiol 3-0.02 mg Tab Commonly known as:  Stoney Pacer (28) Take 1 Tab by mouth daily. fluticasone 50 mcg/actuation nasal spray Commonly known as:  FLONASE  
2 sprays by Both Nostrils route daily. SUMAtriptan 50 mg tablet Commonly known as:  IMITREX Take one tablet once. May repeat dose X 1 after 2 hours Prescriptions Sent to Pharmacy Refills  
 drospirenone-ethinyl estradiol (GIANVI, 28,) 3-0.02 mg tab 4 Sig: Take 1 Tab by mouth daily. Class: Normal  
 Pharmacy: [address] Ph #: [phone] Route: Oral  
  
We Performed the Following CBC WITH AUTOMATED DIFF [19226 CPT(R)] LIPID PANEL [28943 CPT(R)] METABOLIC PANEL, COMPREHENSIVE [29498 CPT(R)] PAP IG, CT-NG-TV, RFX APTIMA HPV ASCUS (619042,312487) [[id number] Custom] TSH 3RD GENERATION [89911 CPT(R)] VITAMIN D, 25 HYDROXY U039497 CPT(R)] Please provide this summary of care documentation to your next provider. Your primary care clinician is listed as Cleo Valdovinos. If you have any questions after today's visit, please call [phone]. Group Home

## 2020-09-15 ENCOUNTER — OFFICE VISIT (OUTPATIENT)
Dept: INTERNAL MEDICINE CLINIC | Age: 35
End: 2020-09-15
Payer: COMMERCIAL

## 2020-09-15 VITALS
HEART RATE: 90 BPM | WEIGHT: 237 LBS | SYSTOLIC BLOOD PRESSURE: 129 MMHG | TEMPERATURE: 96.5 F | BODY MASS INDEX: 35.1 KG/M2 | RESPIRATION RATE: 16 BRPM | OXYGEN SATURATION: 99 % | DIASTOLIC BLOOD PRESSURE: 87 MMHG | HEIGHT: 69 IN

## 2020-09-15 DIAGNOSIS — N91.2 AMENORRHEA: ICD-10-CM

## 2020-09-15 DIAGNOSIS — R35.0 URINE FREQUENCY: Primary | ICD-10-CM

## 2020-09-15 DIAGNOSIS — N64.4 BREAST TENDERNESS: ICD-10-CM

## 2020-09-15 LAB
BILIRUB UR QL STRIP: NEGATIVE
GLUCOSE UR-MCNC: NEGATIVE MG/DL
HCG URINE, QL. (POC): NEGATIVE
KETONES P FAST UR STRIP-MCNC: NEGATIVE MG/DL
PH UR STRIP: 5.5 [PH] (ref 4.6–8)
PROT UR QL STRIP: NORMAL
SP GR UR STRIP: 1.03 (ref 1–1.03)
UA UROBILINOGEN AMB POC: NORMAL (ref 0.2–1)
URINALYSIS CLARITY POC: CLEAR
URINALYSIS COLOR POC: YELLOW
URINE BLOOD POC: NEGATIVE
URINE LEUKOCYTES POC: NEGATIVE
URINE NITRITES POC: NEGATIVE
VALID INTERNAL CONTROL?: YES

## 2020-09-15 PROCEDURE — 81025 URINE PREGNANCY TEST: CPT | Performed by: PHYSICIAN ASSISTANT

## 2020-09-15 PROCEDURE — 81003 URINALYSIS AUTO W/O SCOPE: CPT | Performed by: PHYSICIAN ASSISTANT

## 2020-09-15 PROCEDURE — 99213 OFFICE O/P EST LOW 20 MIN: CPT | Performed by: PHYSICIAN ASSISTANT

## 2020-09-15 NOTE — PROGRESS NOTES
Chief Complaint   Patient presents with    Bladder Infection     onset over month on / off  discomfort    Medication Refill     30 day  refills      she is a 30y.o. year old female who presents for evaluation. Patient presents to the office for check of possible uti and amenorrhea. She shares she has been having increase urinary frequency for the past month. She has not been having burning with urination or lower abdominal pain. She did not have her cycle this past month. In July she had to take medication to have a cycle in July. She reports she has been having some breat tenderness and has taken a couple of urine pregnancy test but they have been negative. She shares her headaches are much better since she had the wedding. She does believe now the increase in her headaches were due to stress. Reviewed and agree with Nurse Note and duplicated in this note. Reviewed PmHx, RxHx, FmHx, SocHx, AllgHx and updated and dated in the chart.     Review of Systems - negative except as listed above    Objective:     Vitals:    09/15/20 1323   BP: 129/87   Pulse: 90   Resp: 16   Temp: (!) 96.5 °F (35.8 °C)   TempSrc: Temporal   SpO2: 99%   Weight: 237 lb (107.5 kg)   Height: 5' 9\" (1.753 m)     Physical Examination: General appearance - alert, well appearing, and in no distress and overweight  Mental status - normal mood, behavior, speech, dress, motor activity, and thought processes  Results for orders placed or performed in visit on 09/15/20   AMB POC URINALYSIS DIP STICK MANUAL W/O MICRO   Result Value Ref Range    Color (UA POC) Yellow     Clarity (UA POC) Clear     Glucose (UA POC) Negative Negative    Bilirubin (UA POC) Negative Negative    Ketones (UA POC) Negative Negative    Specific gravity (UA POC) 1.030 1.001 - 1.035    Blood (UA POC) Negative Negative    pH (UA POC) 5.5 4.6 - 8.0    Protein (UA POC) Trace Negative    Urobilinogen (UA POC) 0.2 mg/dL 0.2 - 1    Nitrites (UA POC) Negative Negative Leukocyte esterase (UA POC) Negative Negative       Assessment/ Plan:   Diagnoses and all orders for this visit:    1. Urine frequency  -     AMB POC URINALYSIS DIP STICK MANUAL W/O MICRO    2. Amenorrhea  -     HCG QL SERUM; Future    3. Breast tenderness  -     HCG QL SERUM; Future     urine analysis just a trace of protein. I would like for the patient to get blood work to make sure not pregnant. I will reach out to Dr. Perrin Cabot office in reference to the plan for the patient as far as trying to get pregnant. She will be contact once I hear back from Dr. Perrin Cabot      I have discussed the diagnosis with the patient and the intended plan as seen in the above orders. The patient has received an after-visit summary and questions were answered concerning future plans.      Medication Side Effects and Warnings were discussed with patient: yes  Patient Labs were reviewed and or requested: yes  Patient Past Records were reviewed and or requested  yes    Cleo Valdovinos PA-C

## 2020-09-16 LAB — B-HCG SERPL QL: NEGATIVE MIU/ML

## 2020-10-11 DIAGNOSIS — F41.9 ANXIETY: ICD-10-CM

## 2020-10-11 DIAGNOSIS — G43.821 MENSTRUAL MIGRAINE WITH STATUS MIGRAINOSUS, NOT INTRACTABLE: ICD-10-CM

## 2020-10-12 RX ORDER — FROVATRIPTAN SUCCINATE 2.5 MG/1
TABLET, FILM COATED ORAL
Qty: 30 TAB | Refills: 1 | Status: SHIPPED | OUTPATIENT
Start: 2020-10-12 | End: 2021-04-23 | Stop reason: SDUPTHER

## 2020-10-12 RX ORDER — ALPRAZOLAM 0.25 MG/1
TABLET ORAL
Qty: 30 TAB | Refills: 0 | Status: ON HOLD | OUTPATIENT
Start: 2020-10-12 | End: 2021-09-23

## 2020-10-19 ENCOUNTER — TELEPHONE (OUTPATIENT)
Dept: INTERNAL MEDICINE CLINIC | Age: 35
End: 2020-10-19

## 2020-10-19 DIAGNOSIS — E66.01 SEVERE OBESITY (HCC): Primary | ICD-10-CM

## 2020-10-20 ENCOUNTER — VIRTUAL VISIT (OUTPATIENT)
Dept: FAMILY MEDICINE CLINIC | Age: 35
End: 2020-10-20
Payer: COMMERCIAL

## 2020-10-20 DIAGNOSIS — E78.00 ELEVATED CHOLESTEROL: Primary | ICD-10-CM

## 2020-10-20 DIAGNOSIS — R79.89 ABNORMAL LIVER FUNCTION TEST: ICD-10-CM

## 2020-10-20 DIAGNOSIS — E66.9 OBESITY, CLASS II, BMI 35-39.9: ICD-10-CM

## 2020-10-20 DIAGNOSIS — Z13.1 SCREENING FOR DIABETES MELLITUS: ICD-10-CM

## 2020-10-20 PROCEDURE — 99213 OFFICE O/P EST LOW 20 MIN: CPT | Performed by: FAMILY MEDICINE

## 2020-10-20 RX ORDER — LETROZOLE 2.5 MG/1
TABLET, FILM COATED ORAL
Status: ON HOLD | COMMUNITY
End: 2021-09-23

## 2020-10-20 RX ORDER — MEDROXYPROGESTERONE ACETATE 10 MG/1
TABLET ORAL
Status: ON HOLD | COMMUNITY
Start: 2020-07-14 | End: 2021-09-23

## 2020-10-20 NOTE — PROGRESS NOTES
Azalea Dumont is a 37 y.o. female who was seen by synchronous (real-time) audio-video technology on 10/20/2020. Consent:  She and/or her healthcare decision maker is aware that this patient-initiated Telehealth encounter is a billable service, with coverage as determined by her insurance carrier. She is aware that she may receive a bill and has provided verbal consent to proceed: Yes    I was at home while conducting this encounter. Weight Loss Progress Note: Initial Physician Visit      Azalea Dumont is a 37 y.o. female with BMI   35   who is here for her Initial Evaluation for the medical bariatric care. CC: I want to be healthier      She has been struggling to lose weight  Weight History  Current weight 235 and   Goal weight  BMI 29  Highest weight 235   (See weight gain time line scanned into media section of chart)                     Weight loss History  How many weight loss attempts have you had? Tried cutting carbs etc and no weight loss programs  She struggles to be consistent  Which program were you most successful doing?  na    Significant Medical History    Have you ever taken appetite suppressants? no   If yes: Rx or OTC? If yes; Any negative side effects? Ever diagnosed with sleep apnea or put on CPAP no    Ever had bariatric surgery: no    Pregnant or planning on becoming pregnant w/in 6 months: no        Significant Psychosocial History   Any history of drug abuse or dependence: no  Current Major Lifestyle Changes: no  Any potential unsupportive people: no      History of binge eating disorder or anorexia : no   If yes, are you currently being treated no    Social History  Social History     Tobacco Use    Smoking status: Former Smoker     Packs/day: 0.25    Smokeless tobacco: Never Used   Substance Use Topics    Alcohol use: No     Alcohol/week: 0.0 standard drinks     How many times a week do you eat out? Information not gotten    Do you drink any EtOH? no   If so, how much? Do you have upcoming any travel in the next 6 weeks?  no   If so, what do you have planned? Exercise  How many days a week do you currently exercise?  0 days  Have you ever been told by a physician not to exercise?  no            Labs: ordered today    Review of Systems  Complete ROS negative except where noted above    712    ROS    PHYSICAL EXAMINATION:  [ INSTRUCTIONS:  \"[x]\" Indicates a positive item  \"[]\" Indicates a negative item  -- DELETE ALL ITEMS NOT EXAMINED]  Vital Signs: (As obtained by patient/caregiver at home)  There were no vitals taken for this visit. Constitutional: [x] Appears well-developed and well-nourished [x] No apparent distress      [] Abnormal -     Mental status: [x] Alert and awake  [x] Oriented to person/place/time [x] Able to follow commands    [] Abnormal -     Eyes:   EOM    [x]  Normal    [] Abnormal -   Sclera  [x]  Normal    [] Abnormal -          Discharge [x]  None visible   [] Abnormal -     HENT: [x] Normocephalic, atraumatic  [] Abnormal -   [x] Mouth/Throat: Mucous membranes are moist    External Ears [x] Normal  [] Abnormal -  Skin tags -    Acanthosis Nigricans -       Neck: [x] No visualized mass [] Abnormal -     Pulmonary/Chest: [x] Respiratory effort normal   [x] No visualized signs of difficulty breathing or respiratory distress        [] Abnormal -      Musculoskeletal:   [x] Normal gait with no signs of ataxia         [x] Normal range of motion of neck        [] Abnormal -     Neurological:        [x] No Facial Asymmetry (Cranial nerve 7 motor function) (limited exam due to video visit)          [x] No gaze palsy        [] Abnormal -          Skin:        [x] No significant exanthematous lesions or discoloration noted on facial skin         [] Abnormal -            Psychiatric:       [x] Normal Affect [] Abnormal -        [x] No Hallucinations    Assessment & Plan:   Diagnoses and all orders for this visit:    1.  Elevated cholesterol  -     LIPID PANEL; Future  -     METABOLIC PANEL, COMPREHENSIVE; Future  -     LIPID PANEL; Future    2. Obesity, Class II, BMI 35-39.9  Diet Plan:  LCD 1200 cornelia max, low carb  Given written and verbal directions    Activity Plan:  Aim for 300 min a week ,  start w 150 per week and work up  Medication Plan: nothing for appetite  3. Screening for diabetes mellitus  -     HEMOGLOBIN A1C WITH EAG; Future  -     HEMOGLOBIN A1C WITH EAG; Future    4. Abnormal liver function test  -     METABOLIC PANEL, COMPREHENSIVE; Future  -     METABOLIC PANEL, COMPREHENSIVE; Future                    Patient Instructions   LOW CORNELIA DIET     Drink 64 ounces of water each day  Exercise at least 150 -300 mins a week    Breakfast  Either a premier protein meal replacement*( 30 g of protein) or 2 boiled eggs  And 1 piece of fruit( apple, orange, banana, grapefruit or pear)      Lunch  Either a premier protein drink* (30 G ) or 3 ounces of lean meat and 2 servings of any leafy green vegetables. Can also have as 1 option for vegetables orourke beans or green peas. AND 1 piece of fruit as listed above    Snack: premier protein drink*    Dinner  4 ounces of lean meat with 2 servings of vegetables ( as listed above)  Also may have a small-medium baked sweet potato    For tea or coffee use stevia sweetener            *if lactose intolerant use Sotelo protein powder or premixed drink as the meal replacement                  Based on his history and exam, Thom Kingston is a good candidate for the Non-[facility] Weight Loss Program          The primary encounter diagnosis was Elevated cholesterol. Diagnoses of Obesity, Class II, BMI 35-39.9, Screening for diabetes mellitus, and Abnormal liver function test were also pertinent to this visit.           Other pertinent observable physical exam findings:-    Coding Help - Use CPT Codes 86618-36605, 06287-49831 for Established and New Patients respectively, either employing EM elements or Time rules. Other codes (example consult codes) may also apply. We discussed the expected course, resolution and complications of the diagnosis(es) in detail. Medication risks, benefits, costs, interactions, and alternatives were discussed as indicated. I advised her to contact the office if her condition worsens, changes or fails to improve as anticipated. She expressed understanding with the diagnosis(es) and plan. Pursuant to the emergency declaration under the [address] [place] [address], [facility] waiver authority and the [employer] and [employer] Act, this Virtual  Visit was conducted, with patient's consent, to reduce the patient's risk of exposure to COVID-19 and provide continuity of care for an established patient. Services were provided through a video synchronous discussion virtually to substitute for in-person clinic visit.     Humberto Almeida MD

## 2020-10-20 NOTE — PATIENT INSTRUCTIONS
LOW MAY DIET Drink 64 ounces of water each day Exercise at least 150 -300 mins a week Breakfast 
Either a premier protein meal replacement*( 30 g of protein) or 2 boiled eggs And 1 piece of fruit( apple, orange, banana, grapefruit or pear) Lunch Either a premier protein drink* (30 G ) or 3 ounces of lean meat and 2 servings of any leafy green vegetables. Can also have as 1 option for vegetables orourke beans or green peas. AND 1 piece of fruit as listed above Snack: premier protein drink* Omelia Mandy 4 ounces of lean meat with 2 servings of vegetables ( as listed above) Also may have a small-medium baked sweet potato For tea or coffee use stevia sweetener *if lactose intolerant use Sotelo protein powder or premixed drink as the meal replacement

## 2020-10-20 NOTE — PROGRESS NOTES
Patient stated name &   Chief Complaint   Patient presents with   [address] Patient     Weight management       Health Maintenance Due   Topic    DTaP/Tdap/Td series (1 - Tdap)    Flu Vaccine (1)       Wt Readings from Last 3 Encounters:   09/15/20 237 lb (107.5 kg)   20 238 lb 6.4 oz (108.1 kg)   10/24/19 224 lb (101.6 kg)     Temp Readings from Last 3 Encounters:   09/15/20 (!) 96.5 °F (35.8 °C) (Temporal)   20 98.5 °F (36.9 °C) (Oral)   10/24/19 98.2 °F (36.8 °C) (Oral)     BP Readings from Last 3 Encounters:   09/15/20 129/87   20 135/89   10/24/19 114/86     Pulse Readings from Last 3 Encounters:   09/15/20 90   20 96   10/24/19 75         Learning Assessment:  :     Learning Assessment 2018 2017 11/15/2016 10/7/2014 2014   PRIMARY LEARNER Patient Patient Patient Patient Patient   HIGHEST LEVEL OF EDUCATION - PRIMARY LEARNER  - - - 2 YEARS OF COLLEGE -   BARRIERS PRIMARY LEARNER - - - NONE -   CO-LEARNER CAREGIVER - - - No -   PRIMARY LANGUAGE ENGLISH ENGLISH ENGLISH ENGLISH ENGLISH   LEARNER PREFERENCE PRIMARY DEMONSTRATION DEMONSTRATION DEMONSTRATION DEMONSTRATION LISTENING     - - - VIDEOS DEMONSTRATION   ANSWERED BY self self self self patient   RELATIONSHIP SELF SELF SELF SELF SELF       Depression Screening:  :     3 most recent PHQ Screens 9/15/2020   Little interest or pleasure in doing things Not at all   Feeling down, depressed, irritable, or hopeless Not at all   Total Score PHQ 2 0       Fall Risk Assessment:  :     No flowsheet data found. Abuse Screening:  :     Abuse Screening Questionnaire 2020   Do you ever feel afraid of your partner? N N N N   Are you in a relationship with someone who physically or mentally threatens you? N N N N   Is it safe for you to go home? [employer]       Coordination of Care Questionnaire:  :     1) Have you been to an emergency room, urgent care clinic since your last visit?  No    Hospitalized since your last visit? No             2) Have you seen or consulted any other health care providers outside of [address] [place] [address] since your last visit? No  (Include any pap smears or colon screenings in this section.)    Patient is accompanied by VV I have received verbal consent from [address] to discuss any/all medical information while they are present in the room.

## 2021-02-03 LAB
ANTIBODY SCREEN, EXTERNAL: NEGATIVE
CHLAMYDIA, EXTERNAL: NEGATIVE
HBSAG, EXTERNAL: NEGATIVE
HIV, EXTERNAL: NORMAL
N. GONORRHEA, EXTERNAL: NEGATIVE
RPR, EXTERNAL: NORMAL
RUBELLA, EXTERNAL: NORMAL
TYPE, ABO & RH, EXTERNAL: NORMAL

## 2021-04-23 DIAGNOSIS — G43.821 MENSTRUAL MIGRAINE WITH STATUS MIGRAINOSUS, NOT INTRACTABLE: ICD-10-CM

## 2021-04-23 RX ORDER — FROVATRIPTAN SUCCINATE 2.5 MG/1
TABLET, FILM COATED ORAL
Qty: 30 TAB | Refills: 1 | Status: ON HOLD | OUTPATIENT
Start: 2021-04-23 | End: 2021-09-23

## 2021-09-01 LAB — GRBS, EXTERNAL: NEGATIVE

## 2021-09-10 ENCOUNTER — TRANSCRIBE ORDER (OUTPATIENT)
Dept: REGISTRATION | Age: 36
End: 2021-09-10

## 2021-09-10 ENCOUNTER — HOSPITAL ENCOUNTER (OUTPATIENT)
Dept: LAB | Age: 36
Discharge: HOME | End: 2021-09-10

## 2021-09-10 DIAGNOSIS — Z20.822 ENCOUNTER FOR PREOPERATIVE SCREENING LABORATORY TESTING FOR COVID-19 VIRUS: ICD-10-CM

## 2021-09-10 DIAGNOSIS — Z20.822 ENCOUNTER FOR PREOPERATIVE SCREENING LABORATORY TESTING FOR COVID-19 VIRUS: Primary | ICD-10-CM

## 2021-09-10 DIAGNOSIS — Z01.812 ENCOUNTER FOR PREOPERATIVE SCREENING LABORATORY TESTING FOR COVID-19 VIRUS: ICD-10-CM

## 2021-09-10 DIAGNOSIS — Z01.812 ENCOUNTER FOR PREOPERATIVE SCREENING LABORATORY TESTING FOR COVID-19 VIRUS: Primary | ICD-10-CM

## 2021-09-10 PROCEDURE — U0005 INFEC AGEN DETEC AMPLI PROBE: HCPCS

## 2021-09-12 LAB
SARS-COV-2, XPLCVT: NOT DETECTED
SOURCE, COVRS: NORMAL

## 2021-09-22 ENCOUNTER — HOSPITAL ENCOUNTER (INPATIENT)
Age: 36
LOS: 4 days | Discharge: HOME | End: 2021-09-26
Attending: OBSTETRICS & GYNECOLOGY | Admitting: OBSTETRICS & GYNECOLOGY
Payer: COMMERCIAL

## 2021-09-22 DIAGNOSIS — G89.18 POSTOPERATIVE PAIN: Primary | ICD-10-CM

## 2021-09-22 PROBLEM — Z34.90 PREGNANCY: Status: ACTIVE | Noted: 2021-09-22

## 2021-09-22 PROBLEM — Z34.90 NORMAL PREGNANCY: Status: ACTIVE | Noted: 2021-09-22

## 2021-09-22 LAB
BASOPHILS # BLD: 0 K/UL (ref 0–0.1)
BASOPHILS NFR BLD: 0 % (ref 0–1)
DIFFERENTIAL METHOD BLD: ABNORMAL
EOSINOPHIL # BLD: 0 K/UL (ref 0–0.4)
EOSINOPHIL NFR BLD: 0 % (ref 0–7)
ERYTHROCYTE [DISTWIDTH] IN BLOOD BY AUTOMATED COUNT: 15.4 % (ref 11.5–14.5)
HCT VFR BLD AUTO: 30.2 % (ref 35–47)
HGB BLD-MCNC: 9.4 G/DL (ref 11.5–16)
IMM GRANULOCYTES # BLD AUTO: 0 K/UL (ref 0–0.04)
IMM GRANULOCYTES NFR BLD AUTO: 0 % (ref 0–0.5)
LYMPHOCYTES # BLD: 2 K/UL (ref 0.8–3.5)
LYMPHOCYTES NFR BLD: 23 % (ref 12–49)
MCH RBC QN AUTO: 22.9 PG (ref 26–34)
MCHC RBC AUTO-ENTMCNC: 31.1 G/DL (ref 30–36.5)
MCV RBC AUTO: 73.5 FL (ref 80–99)
MONOCYTES # BLD: 0.6 K/UL (ref 0–1)
MONOCYTES NFR BLD: 7 % (ref 5–13)
NEUTS SEG # BLD: 6.1 K/UL (ref 1.8–8)
NEUTS SEG NFR BLD: 70 % (ref 32–75)
NRBC # BLD: 0 K/UL (ref 0–0.01)
NRBC BLD-RTO: 0 PER 100 WBC
PLATELET # BLD AUTO: 264 K/UL (ref 150–400)
PMV BLD AUTO: 11.9 FL (ref 8.9–12.9)
RBC # BLD AUTO: 4.11 M/UL (ref 3.8–5.2)
WBC # BLD AUTO: 8.7 K/UL (ref 3.6–11)

## 2021-09-22 PROCEDURE — 85025 COMPLETE CBC W/AUTO DIFF WBC: CPT

## 2021-09-22 PROCEDURE — 3E033VJ INTRODUCTION OF OTHER HORMONE INTO PERIPHERAL VEIN, PERCUTANEOUS APPROACH: ICD-10-PCS | Performed by: OBSTETRICS & GYNECOLOGY

## 2021-09-22 PROCEDURE — 65270000029 HC RM PRIVATE

## 2021-09-22 PROCEDURE — 75410000002 HC LABOR FEE PER 1 HR: Performed by: OBSTETRICS & GYNECOLOGY

## 2021-09-22 PROCEDURE — 36415 COLL VENOUS BLD VENIPUNCTURE: CPT

## 2021-09-22 PROCEDURE — 74011250637 HC RX REV CODE- 250/637: Performed by: OBSTETRICS & GYNECOLOGY

## 2021-09-22 PROCEDURE — 59200 INSERT CERVICAL DILATOR: CPT | Performed by: OBSTETRICS & GYNECOLOGY

## 2021-09-22 RX ORDER — OXYTOCIN/RINGER'S LACTATE 30/500 ML
87.3 PLASTIC BAG, INJECTION (ML) INTRAVENOUS AS NEEDED
Status: DISCONTINUED | OUTPATIENT
Start: 2021-09-22 | End: 2021-09-26 | Stop reason: HOSPADM

## 2021-09-22 RX ORDER — ZOLPIDEM TARTRATE 5 MG/1
5 TABLET ORAL
Status: DISPENSED | OUTPATIENT
Start: 2021-09-22 | End: 2021-09-23

## 2021-09-22 RX ORDER — OXYTOCIN/RINGER'S LACTATE 30/500 ML
1-25 PLASTIC BAG, INJECTION (ML) INTRAVENOUS
Status: DISCONTINUED | OUTPATIENT
Start: 2021-09-23 | End: 2021-09-23

## 2021-09-22 RX ORDER — OXYTOCIN/RINGER'S LACTATE 30/500 ML
10 PLASTIC BAG, INJECTION (ML) INTRAVENOUS AS NEEDED
Status: DISCONTINUED | OUTPATIENT
Start: 2021-09-22 | End: 2021-09-26 | Stop reason: HOSPADM

## 2021-09-22 RX ORDER — NALOXONE HYDROCHLORIDE 0.4 MG/ML
0.4 INJECTION, SOLUTION INTRAMUSCULAR; INTRAVENOUS; SUBCUTANEOUS AS NEEDED
Status: DISCONTINUED | OUTPATIENT
Start: 2021-09-22 | End: 2021-09-23 | Stop reason: HOSPADM

## 2021-09-22 RX ORDER — OXYTOCIN/RINGER'S LACTATE 30/500 ML
1-25 PLASTIC BAG, INJECTION (ML) INTRAVENOUS
Status: DISCONTINUED | OUTPATIENT
Start: 2021-09-23 | End: 2021-09-22

## 2021-09-22 RX ORDER — SODIUM CHLORIDE, SODIUM LACTATE, POTASSIUM CHLORIDE, CALCIUM CHLORIDE 600; 310; 30; 20 MG/100ML; MG/100ML; MG/100ML; MG/100ML
125 INJECTION, SOLUTION INTRAVENOUS CONTINUOUS
Status: DISCONTINUED | OUTPATIENT
Start: 2021-09-23 | End: 2021-09-26 | Stop reason: HOSPADM

## 2021-09-22 RX ORDER — SODIUM CHLORIDE 0.9 % (FLUSH) 0.9 %
5-40 SYRINGE (ML) INJECTION AS NEEDED
Status: DISCONTINUED | OUTPATIENT
Start: 2021-09-22 | End: 2021-09-26 | Stop reason: HOSPADM

## 2021-09-22 RX ORDER — LANOLIN ALCOHOL/MO/W.PET/CERES
CREAM (GRAM) TOPICAL
COMMUNITY
End: 2021-12-16 | Stop reason: ALTCHOICE

## 2021-09-22 RX ORDER — SODIUM CHLORIDE 0.9 % (FLUSH) 0.9 %
5-40 SYRINGE (ML) INJECTION EVERY 8 HOURS
Status: DISCONTINUED | OUTPATIENT
Start: 2021-09-22 | End: 2021-09-25

## 2021-09-22 RX ORDER — OXYTOCIN/RINGER'S LACTATE 30/500 ML
0-25 PLASTIC BAG, INJECTION (ML) INTRAVENOUS
Status: DISCONTINUED | OUTPATIENT
Start: 2021-09-23 | End: 2021-09-22

## 2021-09-22 RX ADMIN — ZOLPIDEM TARTRATE 5 MG: 5 TABLET ORAL at 22:19

## 2021-09-22 NOTE — PROGRESS NOTES
1900: Bedside and Verbal shift change report given to Nikolai  69. (oncoming nurse) by Esha Birmingham RN (offgoing nurse). Report included the following information SBAR, Kardex, Intake/Output, MAR, Recent Results and Med Rec Status. 2100: Dr. Willie Arango at bedside, discussing plan of care for induction and cervical ripening with pt. Pt verbalized understanding. 2110: Marceil Hoguet balloon placed successfully by Dr. Willie Arango and inflated with 60/60 cc of normal saline. Orders received to start pitocin in the AM at 0500. Bedside ultrasound confirming vertex presentation by MD.    2210: Dr. Margot Garcia given update on pt and EFM tracing. MD stated pt can be taken off the monitor for the night to sleep. 9/23  5141: Munoz bulb removed using gentle traction. 0700: Bedside and Verbal shift change report given to [address] [place] [address] (oncoming nurse) by Brad Membreno RN (offgoing nurse). Report included the following information SBAR, Kardex, Intake/Output, MAR, Recent Results and Med Rec Status.

## 2021-09-23 ENCOUNTER — ANESTHESIA (OUTPATIENT)
Dept: LABOR AND DELIVERY | Age: 36
End: 2021-09-23
Payer: COMMERCIAL

## 2021-09-23 ENCOUNTER — ANESTHESIA EVENT (OUTPATIENT)
Dept: LABOR AND DELIVERY | Age: 36
End: 2021-09-23
Payer: COMMERCIAL

## 2021-09-23 PROBLEM — O36.8330 MATERNAL CARE FOR ABNORMALITIES OF THE FETAL HEART RATE OR RHYTHM, THIRD TRIMESTER, NOT APPLICABLE OR UNSPECIFIED: Status: ACTIVE | Noted: 2021-09-23

## 2021-09-23 PROBLEM — O41.1230 CHORIOAMNIONITIS IN THIRD TRIMESTER: Status: ACTIVE | Noted: 2021-09-23

## 2021-09-23 PROBLEM — O40.3XX0: Status: ACTIVE | Noted: 2021-09-22

## 2021-09-23 PROBLEM — Z3A.40 40 WEEKS GESTATION OF PREGNANCY: Status: ACTIVE | Noted: 2021-09-23

## 2021-09-23 LAB
ABO + RH BLD: NORMAL
ANION GAP SERPL CALC-SCNC: 9 MMOL/L (ref 5–15)
BASOPHILS # BLD: 0 K/UL (ref 0–0.1)
BASOPHILS NFR BLD: 0 % (ref 0–1)
BLOOD GROUP ANTIBODIES SERPL: NORMAL
BUN SERPL-MCNC: 10 MG/DL (ref 6–20)
BUN/CREAT SERPL: 18 (ref 12–20)
CALCIUM SERPL-MCNC: 8.7 MG/DL (ref 8.5–10.1)
CHLORIDE SERPL-SCNC: 107 MMOL/L (ref 97–108)
CO2 SERPL-SCNC: 21 MMOL/L (ref 21–32)
CREAT SERPL-MCNC: 0.55 MG/DL (ref 0.55–1.02)
DIFFERENTIAL METHOD BLD: ABNORMAL
EOSINOPHIL # BLD: 0 K/UL (ref 0–0.4)
EOSINOPHIL NFR BLD: 0 % (ref 0–7)
ERYTHROCYTE [DISTWIDTH] IN BLOOD BY AUTOMATED COUNT: 15.3 % (ref 11.5–14.5)
GLUCOSE SERPL-MCNC: 81 MG/DL (ref 65–100)
HCT VFR BLD AUTO: 29.3 % (ref 35–47)
HGB BLD-MCNC: 9.3 G/DL (ref 11.5–16)
IMM GRANULOCYTES # BLD AUTO: 0.1 K/UL (ref 0–0.04)
IMM GRANULOCYTES NFR BLD AUTO: 1 % (ref 0–0.5)
LYMPHOCYTES # BLD: 1.1 K/UL (ref 0.8–3.5)
LYMPHOCYTES NFR BLD: 7 % (ref 12–49)
MCH RBC QN AUTO: 23.3 PG (ref 26–34)
MCHC RBC AUTO-ENTMCNC: 31.7 G/DL (ref 30–36.5)
MCV RBC AUTO: 73.4 FL (ref 80–99)
MONOCYTES # BLD: 1 K/UL (ref 0–1)
MONOCYTES NFR BLD: 6 % (ref 5–13)
NEUTS SEG # BLD: 13.5 K/UL (ref 1.8–8)
NEUTS SEG NFR BLD: 86 % (ref 32–75)
NRBC # BLD: 0 K/UL (ref 0–0.01)
NRBC BLD-RTO: 0 PER 100 WBC
PLATELET # BLD AUTO: 246 K/UL (ref 150–400)
PMV BLD AUTO: 12.2 FL (ref 8.9–12.9)
POTASSIUM SERPL-SCNC: 3.9 MMOL/L (ref 3.5–5.1)
RBC # BLD AUTO: 3.99 M/UL (ref 3.8–5.2)
SODIUM SERPL-SCNC: 137 MMOL/L (ref 136–145)
SPECIMEN EXP DATE BLD: NORMAL
WBC # BLD AUTO: 15.7 K/UL (ref 3.6–11)

## 2021-09-23 PROCEDURE — 74011000258 HC RX REV CODE- 258: Performed by: OBSTETRICS & GYNECOLOGY

## 2021-09-23 PROCEDURE — 76010000391 HC C SECN FIRST 1 HR: Performed by: OBSTETRICS & GYNECOLOGY

## 2021-09-23 PROCEDURE — 74011250636 HC RX REV CODE- 250/636: Performed by: OBSTETRICS & GYNECOLOGY

## 2021-09-23 PROCEDURE — 80048 BASIC METABOLIC PNL TOTAL CA: CPT

## 2021-09-23 PROCEDURE — 77030005513 HC CATH URETH FOL11 MDII -B

## 2021-09-23 PROCEDURE — 65270000029 HC RM PRIVATE

## 2021-09-23 PROCEDURE — 75410000003 HC RECOV DEL/VAG/CSECN EA 0.5 HR: Performed by: OBSTETRICS & GYNECOLOGY

## 2021-09-23 PROCEDURE — 74011250637 HC RX REV CODE- 250/637: Performed by: OBSTETRICS & GYNECOLOGY

## 2021-09-23 PROCEDURE — 86901 BLOOD TYPING SEROLOGIC RH(D): CPT

## 2021-09-23 PROCEDURE — 36415 COLL VENOUS BLD VENIPUNCTURE: CPT

## 2021-09-23 PROCEDURE — 75410000002 HC LABOR FEE PER 1 HR: Performed by: OBSTETRICS & GYNECOLOGY

## 2021-09-23 PROCEDURE — 00HU33Z INSERTION OF INFUSION DEVICE INTO SPINAL CANAL, PERCUTANEOUS APPROACH: ICD-10-PCS | Performed by: ANESTHESIOLOGY

## 2021-09-23 PROCEDURE — 85025 COMPLETE CBC W/AUTO DIFF WBC: CPT

## 2021-09-23 PROCEDURE — 74011250636 HC RX REV CODE- 250/636

## 2021-09-23 PROCEDURE — 74011250636 HC RX REV CODE- 250/636: Performed by: NURSE ANESTHETIST, CERTIFIED REGISTERED

## 2021-09-23 PROCEDURE — 74011000250 HC RX REV CODE- 250: Performed by: NURSE ANESTHETIST, CERTIFIED REGISTERED

## 2021-09-23 PROCEDURE — 76060000078 HC EPIDURAL ANESTHESIA: Performed by: OBSTETRICS & GYNECOLOGY

## 2021-09-23 PROCEDURE — 77030014125 HC TY EPDRL BBMI -B: Performed by: STUDENT IN AN ORGANIZED HEALTH CARE EDUCATION/TRAINING PROGRAM

## 2021-09-23 RX ORDER — FENTANYL/BUPIVACAINE/NS/PF 2-1250MCG
12 PREFILLED PUMP RESERVOIR EPIDURAL CONTINUOUS
Status: DISCONTINUED | OUTPATIENT
Start: 2021-09-23 | End: 2021-09-23 | Stop reason: HOSPADM

## 2021-09-23 RX ORDER — ACETAMINOPHEN 325 MG/1
650 TABLET ORAL ONCE
Status: COMPLETED | OUTPATIENT
Start: 2021-09-23 | End: 2021-09-23

## 2021-09-23 RX ORDER — ONDANSETRON 2 MG/ML
INJECTION INTRAMUSCULAR; INTRAVENOUS AS NEEDED
Status: DISCONTINUED | OUTPATIENT
Start: 2021-09-23 | End: 2021-09-23 | Stop reason: HOSPADM

## 2021-09-23 RX ORDER — LIDOCAINE HYDROCHLORIDE AND EPINEPHRINE 15; 5 MG/ML; UG/ML
INJECTION, SOLUTION EPIDURAL AS NEEDED
Status: DISCONTINUED | OUTPATIENT
Start: 2021-09-23 | End: 2021-09-23 | Stop reason: HOSPADM

## 2021-09-23 RX ORDER — METHYLERGONOVINE MALEATE 0.2 MG/ML
0.2 INJECTION INTRAVENOUS
Status: ACTIVE | OUTPATIENT
Start: 2021-09-23 | End: 2021-09-24

## 2021-09-23 RX ORDER — KETOROLAC TROMETHAMINE 30 MG/ML
30 INJECTION, SOLUTION INTRAMUSCULAR; INTRAVENOUS EVERY 6 HOURS
Status: DISCONTINUED | OUTPATIENT
Start: 2021-09-24 | End: 2021-09-23

## 2021-09-23 RX ORDER — BUPIVACAINE HYDROCHLORIDE 2.5 MG/ML
INJECTION, SOLUTION EPIDURAL; INFILTRATION; INTRACAUDAL AS NEEDED
Status: DISCONTINUED | OUTPATIENT
Start: 2021-09-23 | End: 2021-09-23 | Stop reason: HOSPADM

## 2021-09-23 RX ORDER — OXYCODONE HYDROCHLORIDE 5 MG/1
10 TABLET ORAL
Status: DISCONTINUED | OUTPATIENT
Start: 2021-09-23 | End: 2021-09-26 | Stop reason: HOSPADM

## 2021-09-23 RX ORDER — KETOROLAC TROMETHAMINE 30 MG/ML
30 INJECTION, SOLUTION INTRAMUSCULAR; INTRAVENOUS EVERY 6 HOURS
Status: DISCONTINUED | OUTPATIENT
Start: 2021-09-23 | End: 2021-09-25

## 2021-09-23 RX ORDER — CLINDAMYCIN PHOSPHATE 900 MG/50ML
900 INJECTION, SOLUTION INTRAVENOUS EVERY 8 HOURS
Status: DISCONTINUED | OUTPATIENT
Start: 2021-09-23 | End: 2021-09-25

## 2021-09-23 RX ORDER — FENTANYL CITRATE 50 UG/ML
INJECTION, SOLUTION INTRAMUSCULAR; INTRAVENOUS AS NEEDED
Status: DISCONTINUED | OUTPATIENT
Start: 2021-09-23 | End: 2021-09-23 | Stop reason: HOSPADM

## 2021-09-23 RX ORDER — OXYTOCIN 10 [USP'U]/ML
INJECTION, SOLUTION INTRAMUSCULAR; INTRAVENOUS AS NEEDED
Status: DISCONTINUED | OUTPATIENT
Start: 2021-09-23 | End: 2021-09-23 | Stop reason: HOSPADM

## 2021-09-23 RX ORDER — MORPHINE SULFATE 0.5 MG/ML
INJECTION, SOLUTION EPIDURAL; INTRATHECAL; INTRAVENOUS AS NEEDED
Status: DISCONTINUED | OUTPATIENT
Start: 2021-09-23 | End: 2021-09-23 | Stop reason: HOSPADM

## 2021-09-23 RX ORDER — OXYTOCIN/RINGER'S LACTATE 30/500 ML
1-25 PLASTIC BAG, INJECTION (ML) INTRAVENOUS
Status: DISCONTINUED | OUTPATIENT
Start: 2021-09-23 | End: 2021-09-23 | Stop reason: HOSPADM

## 2021-09-23 RX ORDER — LIDOCAINE HYDROCHLORIDE AND EPINEPHRINE 20; 5 MG/ML; UG/ML
INJECTION, SOLUTION EPIDURAL; INFILTRATION; INTRACAUDAL; PERINEURAL AS NEEDED
Status: DISCONTINUED | OUTPATIENT
Start: 2021-09-23 | End: 2021-09-23 | Stop reason: HOSPADM

## 2021-09-23 RX ORDER — ADHESIVE BANDAGE
30 BANDAGE TOPICAL DAILY PRN
Status: DISCONTINUED | OUTPATIENT
Start: 2021-09-23 | End: 2021-09-26 | Stop reason: HOSPADM

## 2021-09-23 RX ORDER — EPHEDRINE SULFATE/0.9% NACL/PF 50 MG/5 ML
10 SYRINGE (ML) INTRAVENOUS
Status: DISCONTINUED | OUTPATIENT
Start: 2021-09-23 | End: 2021-09-23 | Stop reason: HOSPADM

## 2021-09-23 RX ORDER — FACIAL-BODY WIPES
10 EACH TOPICAL DAILY PRN
Status: DISCONTINUED | OUTPATIENT
Start: 2021-09-23 | End: 2021-09-26 | Stop reason: HOSPADM

## 2021-09-23 RX ADMIN — SODIUM CHLORIDE, POTASSIUM CHLORIDE, SODIUM LACTATE AND CALCIUM CHLORIDE 999 ML/HR: 600; 310; 30; 20 INJECTION, SOLUTION INTRAVENOUS at 09:17

## 2021-09-23 RX ADMIN — LIDOCAINE HYDROCHLORIDE AND EPINEPHRINE 3.5 ML: 15; 5 INJECTION, SOLUTION EPIDURAL at 10:11

## 2021-09-23 RX ADMIN — GENTAMICIN SULFATE 440 MG: 40 INJECTION, SOLUTION INTRAMUSCULAR; INTRAVENOUS at 19:10

## 2021-09-23 RX ADMIN — FENTANYL CITRATE 50 MCG: 50 INJECTION, SOLUTION INTRAMUSCULAR; INTRAVENOUS at 19:49

## 2021-09-23 RX ADMIN — FENTANYL CITRATE 50 MCG: 50 INJECTION, SOLUTION INTRAMUSCULAR; INTRAVENOUS at 19:20

## 2021-09-23 RX ADMIN — KETOROLAC TROMETHAMINE 30 MG: 30 INJECTION, SOLUTION INTRAMUSCULAR; INTRAVENOUS at 21:17

## 2021-09-23 RX ADMIN — FENTANYL CITRATE 50 MCG: 50 INJECTION, SOLUTION INTRAMUSCULAR; INTRAVENOUS at 19:24

## 2021-09-23 RX ADMIN — LIDOCAINE HYDROCHLORIDE,EPINEPHRINE BITARTRATE 5 ML: 20; .005 INJECTION, SOLUTION EPIDURAL; INFILTRATION; INTRACAUDAL; PERINEURAL at 19:07

## 2021-09-23 RX ADMIN — OXYTOCIN 30 UNITS: 10 INJECTION, SOLUTION INTRAMUSCULAR; INTRAVENOUS at 19:18

## 2021-09-23 RX ADMIN — CLINDAMYCIN PHOSPHATE 900 MG: 900 INJECTION, SOLUTION INTRAVENOUS at 18:49

## 2021-09-23 RX ADMIN — SODIUM CHLORIDE, POTASSIUM CHLORIDE, SODIUM LACTATE AND CALCIUM CHLORIDE 125 ML/HR: 600; 310; 30; 20 INJECTION, SOLUTION INTRAVENOUS at 04:35

## 2021-09-23 RX ADMIN — MORPHINE SULFATE 2.5 MG: 0.5 INJECTION, SOLUTION EPIDURAL; INTRATHECAL; INTRAVENOUS at 19:23

## 2021-09-23 RX ADMIN — MORPHINE SULFATE 2.5 MG: 0.5 INJECTION, SOLUTION EPIDURAL; INTRATHECAL; INTRAVENOUS at 19:33

## 2021-09-23 RX ADMIN — Medication 12 ML/HR: at 10:40

## 2021-09-23 RX ADMIN — SODIUM CHLORIDE, POTASSIUM CHLORIDE, SODIUM LACTATE AND CALCIUM CHLORIDE 999 ML/HR: 600; 310; 30; 20 INJECTION, SOLUTION INTRAVENOUS at 10:20

## 2021-09-23 RX ADMIN — ONDANSETRON HYDROCHLORIDE 4 MG: 2 SOLUTION INTRAMUSCULAR; INTRAVENOUS at 19:05

## 2021-09-23 RX ADMIN — LIDOCAINE HYDROCHLORIDE,EPINEPHRINE BITARTRATE 5 ML: 20; .005 INJECTION, SOLUTION EPIDURAL; INFILTRATION; INTRACAUDAL; PERINEURAL at 17:20

## 2021-09-23 RX ADMIN — BUPIVACAINE HYDROCHLORIDE 5 ML: 2.5 INJECTION, SOLUTION EPIDURAL; INFILTRATION; INTRACAUDAL; PERINEURAL at 10:15

## 2021-09-23 RX ADMIN — AMPICILLIN SODIUM 2 G: 2 INJECTION, POWDER, FOR SOLUTION INTRAMUSCULAR; INTRAVENOUS at 18:16

## 2021-09-23 RX ADMIN — AMPICILLIN SODIUM 2 G: 2 INJECTION, POWDER, FOR SOLUTION INTRAMUSCULAR; INTRAVENOUS at 23:38

## 2021-09-23 RX ADMIN — ACETAMINOPHEN 650 MG: 325 TABLET ORAL at 17:49

## 2021-09-23 RX ADMIN — OXYTOCIN 2 MILLI-UNITS/MIN: 10 INJECTION, SOLUTION INTRAMUSCULAR; INTRAVENOUS at 05:01

## 2021-09-23 RX ADMIN — SODIUM CHLORIDE, POTASSIUM CHLORIDE, SODIUM LACTATE AND CALCIUM CHLORIDE 125 ML/HR: 600; 310; 30; 20 INJECTION, SOLUTION INTRAVENOUS at 16:59

## 2021-09-23 RX ADMIN — Medication 12 ML/HR: at 17:48

## 2021-09-23 RX ADMIN — FENTANYL CITRATE 50 MCG: 50 INJECTION, SOLUTION INTRAMUSCULAR; INTRAVENOUS at 19:33

## 2021-09-23 RX ADMIN — LIDOCAINE HYDROCHLORIDE,EPINEPHRINE BITARTRATE 5 ML: 20; .005 INJECTION, SOLUTION EPIDURAL; INFILTRATION; INTRACAUDAL; PERINEURAL at 19:04

## 2021-09-23 RX ADMIN — LIDOCAINE HYDROCHLORIDE,EPINEPHRINE BITARTRATE 10 ML: 20; .005 INJECTION, SOLUTION EPIDURAL; INFILTRATION; INTRACAUDAL; PERINEURAL at 19:00

## 2021-09-23 NOTE — PROGRESS NOTES
Labor Progress Note  Patient seen, fetal heart rate and contraction pattern evaluated, patient examined.   Patient Vitals for the past 1 hrs:   BP Pulse SpO2   09/23/21 1119 97/61 85    09/23/21 1118   100 %   09/23/21 1104 (!) 95/53 87    09/23/21 1103   100 %   09/23/21 1053   100 %   09/23/21 1050 111/76 92    09/23/21 1048   100 %   09/23/21 1033 114/71 86 100 %       Physical Exam:  Cervical Exam:  6 cm dilated / 90 % effaced / 0 station soft cervix   Membranes: Uterine Activity: every 3-4 minutes   Fetal Heart Rate: Reactive  Baseline: 145  per minute  14 mu of Pitocin on flow     Assessment/Plan:  Reassuring fetal status, Labor  Progressing normally  Continue expectant management, Continue plan for vaginal delivery     [employer]

## 2021-09-23 NOTE — PROGRESS NOTES
9/23/2021  11:20 AM    CM met with SAW to complete initial assessment and begin discharge planning. MOB verified and confirmed demographics. SAW lives with spouse/FOB-Justus Fernandez ( [phone]), at the address on file. SAW is employed and plans to take 12wks off from work. FOB is also employed and will be taking adequate time off. SAW reports she has good family support. SAW plans to breast feed baby and has pump to use at home. [address] [place] [address]) will provide follow up care for infant. SAW has car seat, bassinet/crib, clothing, bottles and all necessary supplies for baby. SAW has [employer], and will be adding baby to this policy. CM discussed process to add baby to insurance, MOB verbalized understanding. SAW denied needing WIC/Medicaid services. SAW noted that her last name should be Prema Mclain, but it is still isted as Barron Narvaez in the chart. CM called Patient Access to request assistance. Care Management Interventions  PCP Verified by CM: Yes Dmitriy Nichols)  Mode of Transport at Discharge:  Other (see comment)  Transition of Care Consult (CM Consult): Discharge Planning  Support Systems: Other Family Member(s), Spouse/Significant Other  Confirm Follow Up Transport: Family  Discharge Location  Discharge Placement: Home with family assistance  Marvin Walter

## 2021-09-23 NOTE — PROGRESS NOTES
0700: Bedside and Verbal shift change report given to NATALIE Childs RN (oncoming nurse) by JOHAN Gomes RN (offgoing nurse). Report included the following information SBAR, Kardex, Procedure Summary, Intake/Output, MAR, Recent Results, Med Rec Status and Quality Measures. 8215: IVF bolus started at this time for epidural placement. 0845: Dr. Rachelle Cartagena at bedside. SVE per MD- pt 5/80/-1. AROM by MD at this time. Clear/large amount of fluid noted, no odor. Pads changed. [id number] Erazo CRNA at bedside for epidural placement. 1001: Time out for epidural completed with Clearnce Homans CRNA. 1016: test dose admin by CRNA. HR: 105.    1018: Pt back in bed after epidural placement. EFM/TOCO readjusted. 1055: Munoz cath placed per protocol at this time. 1100: Pt turned to L side with peanut ball in between legs. 1210: Pt turned to R side with peanut ball in between legs. 1216: Pt placed in t-cleopatra on R side. TOCO readjusted. 1442: IsadorVenkat Cartagena- pt 8/90/0. Pt placed in throne's position. Pt denies pain, but is c/o intense rectal pressure with contractions. This RN educating pt at pressure is normal, but if it becomes painful to let this RN know to make anesthesia aware. 1518: Dr. Rachelle Cartagena at [address] discussing POC with this RN. Per MD- spoke with pt and FOB- if fetal station unchanged at 1600, will opt for c/s d/t FTP.     1709: SVE by Dr. Arnold- pt unchanged from previous exams. IUPC placed at this time. 1710: IVF bolus started, pt turned to R side with peanut ball in between legs in t-cleopatra. 1715: MIKEY Hermosillo at bedside for re-dose. 1727: Pit decreased from 14ml/hr to 7ml/hr per Dr. Joseph Webber. 1730: Dr. Arnold at bedside to reasses pt late decels. Pitocin stopped at this time. 1735: Pt oral temp of 100.3, axillary: 101.4. Per MD- will draw CBC, BMP, and sample to get type and screen. 650mg Tylenol ordered for temp.  Ampicillin and gentamycin ordered at this time.     1812: Dr. Arnold at nurse's station assessing fetal tracing and late decels. 1830: Per Dr. Isis Cruz called. Will re-draw lab for type and screen. Will allow ampicillin to infuse. Will then let Clinda be admin, followed by ian. 1853: MIKEY Briggs CRNA at bedside at this time to assess epidural and admin medication. 1856: Pt in OR at this time via bed. 1858: FHR in 170s on EFM in OR after transfer to bed.     1859: vaginal prep completed. 1905: Bedside and Verbal shift change report given to MELINDA Levin RN (oncoming nurse) by León Perry RN (offgoing nurse). Report included the following information SBAR, Kardex, Procedure Summary, Intake/Output, MAR, Med Rec Status and Quality Measures.

## 2021-09-23 NOTE — ANESTHESIA PREPROCEDURE EVALUATION
Relevant Problems   ENDOCRINE   (+) Obesity   (+) Severe obesity (HCC)       Anesthetic History   No history of anesthetic complications            Review of Systems / Medical History  Patient summary reviewed, nursing notes reviewed and pertinent labs reviewed    Pulmonary  Within defined limits                 Neuro/Psych             Comments: anxiety Cardiovascular              Hyperlipidemia         GI/Hepatic/Renal     GERD: well controlled           Endo/Other        Morbid obesity and anemia     Other Findings              Physical Exam    Airway  Mallampati: II  TM Distance: 4 - 6 cm  Neck ROM: normal range of motion   Mouth opening: Normal     Cardiovascular  Regular rate and rhythm,  S1 and S2 normal,  no murmur, click, rub, or gallop             Dental  No notable dental hx       Pulmonary  Breath sounds clear to auscultation               Abdominal         Other Findings            Anesthetic Plan    ASA: 2  Anesthesia type: epidural and general - backup            Anesthetic plan and risks discussed with: Patient and Spouse      Risks including headache, backache, failure to work and need for replacement, infection, bleeding, and nerve damage discussed. Pt chooses to proceed. Consent signed.

## 2021-09-23 NOTE — PROGRESS NOTES
Labor Progress Note  Patient seen, fetal heart rate and contraction pattern evaluated, patient examined. Patient is comfortable with epidural.  Visit Vitals  BP (!) 111/56   Pulse (!) 101   Temp (!) 101.4 °F (38.6 °C)   Resp 16   Ht 5' 9\" (1.753 m)   Wt 124.3 kg (274 lb)   SpO2 100%   BMI 40.46 kg/m²       Physical Exam:    37 y.o.  in no acute distress. Cervical Exam:   Presentation Vertex  Dilation 8 cms   Effacement 100 %   Station -1  Membranes: Prior amniotomy: Clear  Uterine Activity:   Frequency: Every 4 minutes   Duration: 60 seconds   Intensity: Mild  Fetal Heart Rate:    Baseline: 160 bpm   Variability: Moderate   Accelerations: Absent   Decelerations: Late  Category: 2         Recent Results (from the past 12 hour(s))   CBC WITH AUTOMATED DIFF    Collection Time: 21  5:50 PM   Result Value Ref Range    WBC 15.7 (H) 3.6 - 11.0 K/uL    RBC 3.99 3.80 - 5.20 M/uL    HGB 9.3 (L) 11.5 - 16.0 g/dL    HCT 29.3 (L) 35.0 - 47.0 %    MCV 73.4 (L) 80.0 - 99.0 FL    MCH 23.3 (L) 26.0 - 34.0 PG    MCHC 31.7 30.0 - 36.5 g/dL    RDW 15.3 (H) 11.5 - 14.5 %    PLATELET 085 149 - 702 K/uL    MPV 12.2 8.9 - 12.9 FL    NRBC 0.0 0  WBC    ABSOLUTE NRBC 0.00 0.00 - 0.01 K/uL    NEUTROPHILS 86 (H) 32 - 75 %    LYMPHOCYTES 7 (L) 12 - 49 %    MONOCYTES 6 5 - 13 %    EOSINOPHILS 0 0 - 7 %    BASOPHILS 0 0 - 1 %    IMMATURE GRANULOCYTES 1 (H) 0.0 - 0.5 %    ABS. NEUTROPHILS 13.5 (H) 1.8 - 8.0 K/UL    ABS. LYMPHOCYTES 1.1 0.8 - 3.5 K/UL    ABS. MONOCYTES 1.0 0.0 - 1.0 K/UL    ABS. EOSINOPHILS 0.0 0.0 - 0.4 K/UL    ABS. BASOPHILS 0.0 0.0 - 0.1 K/UL    ABS. IMM.  GRANS. 0.1 (H) 0.00 - 0.04 K/UL    DF AUTOMATED     METABOLIC PANEL, BASIC    Collection Time: 21  5:50 PM   Result Value Ref Range    Sodium 137 136 - 145 mmol/L    Potassium 3.9 3.5 - 5.1 mmol/L    Chloride 107 97 - 108 mmol/L    CO2 21 21 - 32 mmol/L    Anion gap 9 5 - 15 mmol/L    Glucose 81 65 - 100 mg/dL    BUN 10 6 - 20 MG/DL    Creatinine 0. 55 0.55 - 1.02 MG/DL    BUN/Creatinine ratio 18 12 - 20      GFR est AA >60 >60 ml/min/1.73m2    GFR est non-AA >60 >60 ml/min/1.73m2    Calcium 8.7 8.5 - 10.1 MG/DL     Assessment/Plan:  Active Hospital Problems    Diagnosis Date Noted    Maternal care for abnormalities of the fetal heart rate or rhythm, third trimester, not applicable or unspecified 2021     Priority: 1 - One     The late fhr decelerations are still present, even with the mild contractions less frequent. I recommend a  delivery. I reviewed the benefits and risks, including infection, incsional problems, injury to adjacent organs, bleeding, and medical complications (pneumonia, thrombosis). I answered her questions. She agrees.         Chorioamnionitis in third trimester 2021     Priority: 2 - Two     Ampicillin   Gentamicin  Clindamycin        Polyhydramnios, third trimester, delivered 2021     Priority: 3 - Three     Slow progress  IUPC placed  Epidural redosed  Will follow       Body mass index 40.0-44.9, adult ([address] 2021     Priority: 4 - Four    Obesity complicating childbirth 3495     Priority: 4 - Four    40 weeks gestation of pregnancy 2021     Priority: 5 - Five     Ozzie Avila MD  2021

## 2021-09-23 NOTE — PROGRESS NOTES
Labor Progress Note  Patient seen, fetal heart rate and contraction pattern evaluated, patient examined. Patient complains of pain with contractions. Visit Vitals  BP (!) 107/55   Pulse (!) 108   Temp 98.8 °F (37.1 °C)   Resp 16   Ht 5' 9\" (1.753 m)   Wt 124.3 kg (274 lb)   SpO2 100%   BMI 40.46 kg/m²       Physical Exam:    37 y.o.  in no acute distress. Cervical Exam:   Presentation Vertex  Dilation 8 cms   Effacement 100 %   Station -1  Membranes: Prior amniotomy: Clear  Uterine Activity:   Frequency: Every 2 minutes   Duration: 60 seconds   Intensity: Moderate  Fetal Heart Rate:    Baseline: 155 bpm   Variability: Moderate   Accelerations: Absent   Decelerations: Late  Category: 2    Procedure: IUPC placement    No results found for this or any previous visit (from the past 12 hour(s)). Assessment/Plan:  Active Hospital Problems    Diagnosis Date Noted    Labor and delivery complicated by fetal heart rate anomaly, antepartum 2021     Priority: 1 - One     Late fhr decelerations  ?  Epidural just redosed       Polyhydramnios, third trimester, delivered 2021     Priority: 2 - Two     Slow progress  IUPC placed  Epidural redosed  Will follow       Body mass index 40.0-44.9, adult ([address] 2021     Priority: 3 - Three    Obesity complicating childbirth      Priority: 3 - Three    40 weeks gestation of pregnancy 2021     Priority: 4 - Four     Keron Macedo MD  2021

## 2021-09-23 NOTE — PROGRESS NOTES
Labor Progress Note  Patient seen, fetal heart rate and contraction pattern evaluated, patient examined. No data found. Physical Exam:  Cervical Exam:  8 cm dilated  / 100% effaced / soft stretchable .  Vx at 0 station   Membranes:  S/p AROM at 845 am today   Uterine Activity: Frequency: Every 3 minutes  Fetal Heart Rate: Reactive    Assessment/Plan:  Reassuring fetal status, Labor  Progressing normally  Continue expectant management, Continue plan for vaginal delivery   Watch for head descent   Good size baby    Cain Elaine MD

## 2021-09-23 NOTE — PROGRESS NOTES
Labor Progress Note  Patient seen, fetal heart rate and contraction pattern evaluated, patient examined. Patient Vitals for the past 1 hrs:   SpO2   09/23/21 0839 98 %   09/23/21 0834 97 %       Physical Exam:  Cervical Exam:  5/80 %/-1/   Presenting Part: cephalic  Cervical Position: mid position  Consistency: Soft  Membranes:  Artificial Rupture of Membranes;  Amniotic Fluid: large amount of clear fluid done at 845 am   Uterine Activity: Frequency: Every 3 minutes, Duration: 40 seconds and Intensity: mild  Fetal Heart Rate: Reactive  Baseline: 145 per minute  Variability: moderate    Assessment/Plan:  Reassuring fetal status, Labor  Progressing normally, Continue plan for vaginal delivery   Fluid bolus for epidural   Continue management   GBS neg / A pos / Gc Clam neg/ HbsAg neg/ RI / HIV NR/ RPR Neg     Al Carlos MD

## 2021-09-23 NOTE — ANESTHESIA PROCEDURE NOTES
Epidural Block    Start time: 9/23/2021 10:05 AM  End time: 9/23/2021 10:15 AM  Reason for block: labor epidural  Staffing  Performed: CRNA   Anesthesiologist: Tomi Quesada DO  Resident/CRNA: Long Ac CRNA  Preanesthetic Checklist  Completed: patient identified, IV checked, site marked, risks and benefits discussed, surgical consent, monitors and equipment checked, pre-op evaluation and timeout performed  Block Placement  Patient position: sitting  Prep: ChloraPrep  Sterility prep: mask, hand, gown, gloves, drape and cap  Sedation level: no sedation  Patient monitoring: heart rate, frequent blood pressure checks and continuous pulse oximetry  Approach: midline  Location: lumbar  Lumbar location: L3-L4  Epidural  Loss of resistance technique: air and saline  Guidance: landmark technique  Needle  Needle type: Tuohy   Needle gauge: 17 G  Needle length: 9 cm  Needle insertion depth: 6 cm  Catheter type: multi-orifice  Catheter size: 20 G  Catheter at skin depth: 12 cm  Catheter securement method: clear occlusive dressing  Test dose: negative  Assessment  Sensory level: T10  Block outcome: pain improved  Number of attempts: 2  Procedure assessment: patient tolerated procedure well with no immediate complications

## 2021-09-23 NOTE — H&P
History & Physical    Name: Paty Sánchez MRN: [id number]  SSN: [id number]    YOB: 1985  Age: 37 y.o. Sex: female      Subjective:     Estimated Date of Delivery: 21  OB History    Para Term  AB Living   1 0 0 0 0 0   SAB TAB Ectopic Molar Multiple Live Births   0 0 0   0        # Outcome Date GA Lbr Mac/2nd Weight Sex Delivery Anes PTL Lv   1 Current               Obstetric Comments   Menarche:  17 yrs old. LMP: 15 yrs old (IUD). # of Children:  0. Age at Delivery of First Child:  n/a. Hysterectomy/oophorectomy:  NO/NO. Breast Bx:  No.  Hx of Breast Feeding:  n/a. BCP:  Yes. Hormone therapy:  No.     cc: none    HPI: 34yo G1 @ 40w2d presents for scheduled IOL for suspected LGA and polyhydramnios. She has no complaints today. Denies labor symptoms. Baby is very active.        Past Medical History:   Diagnosis Date    Dizziness     only when getting up to fast    High cholesterol     Pap smear for cervical cancer screening 2016    outside records - negative    PCOS (polycystic ovarian syndrome)     Polycystic disease, ovaries     Psychiatric problem      Past Surgical History:   Procedure Laterality Date    HX ORTHOPAEDIC  age 11    left tendon repair    HX OTHER SURGICAL       Social History     Occupational History    Not on file   Tobacco Use    Smoking status: Former Smoker     Packs/day: 0.25    Smokeless tobacco: Never Used   Substance and Sexual Activity    Alcohol use: No     Alcohol/week: 0.0 standard drinks    Drug use: No    Sexual activity: Yes     Partners: Male     Birth control/protection: Pill     Family History   Problem Relation Age of Onset    High Cholesterol Mother     Thyroid Disease Mother     Heart Attack Mother 50    Anxiety Father     Anemia Sister     High Cholesterol Sister     Breast Cancer Maternal Aunt 36    Cancer Maternal Grandmother         breast cancer (?)       No Known Allergies  Prior to Admission medications    Medication Sig Start Date End Date Taking? Authorizing Provider   PNV Comb #2-Iron-FA-Omega 3 29-1-400 mg cmpk Take  by mouth. Yes Provider, Historical   ferrous sulfate (Iron) 325 mg (65 mg iron) tablet Take  by mouth Daily (before breakfast). Yes Provider, Historical   fluticasone (FLONASE) 50 mcg/actuation nasal spray 2 sprays by Both Nostrils route daily. 11/24/14  Yes Cleo Valdovinos PA-C   frovatriptan (FROVA) 2.5 mg tablet Take one tablet daily two days before start of cycle and continue one tablet daily for 5 additional days  Patient not taking: Reported on 9/22/2021 4/23/21   Cleo Valdovinos PA-C   medroxyPROGESTERone (PROVERA) 10 mg tablet medroxyprogesterone 10 mg tablet  Patient not taking: Reported on 9/22/2021 7/14/20   Provider, Historical   letrozole (Femara) 2.5 mg tablet Femara 2.5 mg tablet   1 tablet by mouth once daily on cycle days 3, 4, 5, 6 and 7. Patient not taking: Reported on 9/22/2021    Provider, Historical   ALPRAZolam (XANAX) 0.25 mg tablet TAKE 1 TO 2 TABLETS BY MOUTH EVERY 8 HOURS IF  NEEDED  Patient not taking: Reported on 9/22/2021 10/12/20   Cleo Valdovinos PA-C   metFORMIN (GLUCOPHAGE) 1,000 mg tablet Take 1,000 mg by mouth two (2) times a day. Patient not taking: Reported on 9/22/2021    Provider, Historical   atorvastatin (LIPITOR) 20 mg tablet Take 1 Tab by mouth daily. Patient not taking: Reported on 9/22/2021 3/5/20   Cleo Valdovinos PA-C   buPROPion XL (WELLBUTRIN XL) 150 mg tablet Take 1 Tab by mouth every morning. Patient not taking: Reported on 9/22/2021 3/5/20   Cleo Valdvoinos PA-C   magnesium 250 mg tab Take 1 Tab by mouth daily.   Patient not taking: Reported on 9/22/2021 5/21/19   Dianna Valdovinos PA-C        Review of Systems negative    Objective:     Vitals:  Vitals:    09/22/21 1844 09/22/21 1902 09/22/21 1935   BP: (!) 132/94  128/79   Pulse: 94  90   Resp: 16  16   Temp:   98.6 °F (37 °C)   SpO2:   100%   Weight:  124.3 kg (274 lb)    Height:  5' 9\" (1.753 m)         Physical Exam    GEN: NAD, resting comfortably  Pulm: no resp distress  Abd: soft, gravid, NT  : no lesions  Cervix: 1cm/30%/high and posterior    Cook catheter placed, 60mL sterile saline placed in uterine balloon and 60mL saline in vaginal balloon    Fetal Heart Rate:125, moderate variability, positive accelerations, no decelerations  Tocometry: occasional contractions    Bedside ultrasound: viable intrauterine pregnancy in cephalic presentation. Prenatal Labs:    Lab Results   Component Value Date/Time    Rubella, External Immune 02/03/2021 12:00 AM    GrBStrep, External Negative 09/01/2021 12:00 AM    HBsAg, External Negative 02/03/2021 12:00 AM    HIV, External non-reactive 02/03/2021 12:00 AM    RPR, External non-reactive 02/03/2021 12:00 AM    Gonorrhea, External negative 02/03/2021 12:00 AM    Chlamydia, External negative 02/03/2021 12:00 AM    ABO,Rh A positive 02/03/2021 12:00 AM          Impression/Plan:     34yo G1 @ 40w2d presents for scheduled IOL. 1. IOL: cook catheter placed. Plan pitocin at 0500.    2. IUP: suspected LGA, EFW 8/31/21 8lbs 4oz, 95%. Reassuring fetal surveillance. Intermittent monitoring overnight, then continuous monitoring once pitocin is started. 3. GBS negative  4. COVID vaccinated and COVID screen negative  5. First BP mildly elevated (132/94), next BP normotensive. Patient denies h/o HTN. She was worked up on arrival - will monitor and send labs if she has additional elevated BPs.

## 2021-09-23 NOTE — PROGRESS NOTES
Labor Progress Note  Patient seen, fetal heart rate and contraction pattern evaluated, patient examined. Patient is comfortable with epidural.  Visit Vitals  BP (!) 107/55   Pulse (!) 108   Temp 98.8 °F (37.1 °C)   Resp 16   Ht 5' 9\" (1.753 m)   Wt 124.3 kg (274 lb)   SpO2 100%   BMI 40.46 kg/m²       Physical Exam:    37 y.o.  in no acute distress. Cervical Exam:   Presentation Vertex  Dilation 8 cms   Effacement 100 %   Station -1  Membranes: Prior amniotomy: Clear  Uterine Activity:   Frequency:every 3   Duration: 60 seconds   Intensity: Moderate  Fetal Heart Rate:    Baseline: 155 bpm   Variability: Moderate   Accelerations: Absent   Decelerations: None  Category: 1        No results found for this or any previous visit (from the past 12 hour(s)).   Assessment/Plan:  Active Hospital Problems    Diagnosis Date Noted    Chorioamnionitis in third trimester 2021     Priority: 1 - One     Start ampicillin and gentamicin      Labor and delivery complicated by fetal heart rate anomaly, antepartum 2021     Priority: 2 - Two     resolved      Polyhydramnios, third trimester, delivered 2021     Priority: 3 - Three     Slow progress  IUPC placed  Epidural redosed  Will follow       Body mass index 40.0-44.9, adult ([address] 2021     Priority: 4 - Four    Obesity complicating childbirth      Priority: 4 - Four    40 weeks gestation of pregnancy 2021     Priority: 5 - Five     Zach Fontenot MD  2021

## 2021-09-24 LAB
BASOPHILS # BLD: 0 K/UL (ref 0–0.1)
BASOPHILS NFR BLD: 0 % (ref 0–1)
DIFFERENTIAL METHOD BLD: ABNORMAL
EOSINOPHIL # BLD: 0 K/UL (ref 0–0.4)
EOSINOPHIL NFR BLD: 0 % (ref 0–7)
ERYTHROCYTE [DISTWIDTH] IN BLOOD BY AUTOMATED COUNT: 15.5 % (ref 11.5–14.5)
HCT VFR BLD AUTO: 25.6 % (ref 35–47)
HGB BLD-MCNC: 8.1 G/DL (ref 11.5–16)
IMM GRANULOCYTES # BLD AUTO: 0.1 K/UL (ref 0–0.04)
IMM GRANULOCYTES NFR BLD AUTO: 1 % (ref 0–0.5)
LYMPHOCYTES # BLD: 1.8 K/UL (ref 0.8–3.5)
LYMPHOCYTES NFR BLD: 11 % (ref 12–49)
MCH RBC QN AUTO: 23.3 PG (ref 26–34)
MCHC RBC AUTO-ENTMCNC: 31.6 G/DL (ref 30–36.5)
MCV RBC AUTO: 73.6 FL (ref 80–99)
MONOCYTES # BLD: 1 K/UL (ref 0–1)
MONOCYTES NFR BLD: 6 % (ref 5–13)
NEUTS SEG # BLD: 13.4 K/UL (ref 1.8–8)
NEUTS SEG NFR BLD: 82 % (ref 32–75)
NRBC # BLD: 0 K/UL (ref 0–0.01)
NRBC BLD-RTO: 0 PER 100 WBC
PLATELET # BLD AUTO: 215 K/UL (ref 150–400)
PMV BLD AUTO: 11.7 FL (ref 8.9–12.9)
RBC # BLD AUTO: 3.48 M/UL (ref 3.8–5.2)
WBC # BLD AUTO: 16.4 K/UL (ref 3.6–11)

## 2021-09-24 PROCEDURE — 74011250637 HC RX REV CODE- 250/637: Performed by: OBSTETRICS & GYNECOLOGY

## 2021-09-24 PROCEDURE — 74011250636 HC RX REV CODE- 250/636: Performed by: OBSTETRICS & GYNECOLOGY

## 2021-09-24 PROCEDURE — 85025 COMPLETE CBC W/AUTO DIFF WBC: CPT

## 2021-09-24 PROCEDURE — 36415 COLL VENOUS BLD VENIPUNCTURE: CPT

## 2021-09-24 PROCEDURE — 2709999900 HC NON-CHARGEABLE SUPPLY

## 2021-09-24 PROCEDURE — 65270000029 HC RM PRIVATE

## 2021-09-24 PROCEDURE — 74011000258 HC RX REV CODE- 258: Performed by: OBSTETRICS & GYNECOLOGY

## 2021-09-24 RX ORDER — ACETAMINOPHEN 325 MG/1
650 TABLET ORAL
Status: DISCONTINUED | OUTPATIENT
Start: 2021-09-24 | End: 2021-09-26 | Stop reason: HOSPADM

## 2021-09-24 RX ORDER — SIMETHICONE 80 MG
80 TABLET,CHEWABLE ORAL AS NEEDED
Status: DISCONTINUED | OUTPATIENT
Start: 2021-09-24 | End: 2021-09-26 | Stop reason: HOSPADM

## 2021-09-24 RX ORDER — IBUPROFEN 800 MG/1
800 TABLET ORAL
Status: DISCONTINUED | OUTPATIENT
Start: 2021-09-24 | End: 2021-09-26 | Stop reason: HOSPADM

## 2021-09-24 RX ORDER — SODIUM CHLORIDE 0.9 % (FLUSH) 0.9 %
5-40 SYRINGE (ML) INJECTION AS NEEDED
Status: DISCONTINUED | OUTPATIENT
Start: 2021-09-24 | End: 2021-09-26 | Stop reason: HOSPADM

## 2021-09-24 RX ORDER — ONDANSETRON 2 MG/ML
4 INJECTION INTRAMUSCULAR; INTRAVENOUS
Status: DISCONTINUED | OUTPATIENT
Start: 2021-09-24 | End: 2021-09-26 | Stop reason: HOSPADM

## 2021-09-24 RX ORDER — NALOXONE HYDROCHLORIDE 0.4 MG/ML
0.4 INJECTION, SOLUTION INTRAMUSCULAR; INTRAVENOUS; SUBCUTANEOUS AS NEEDED
Status: DISCONTINUED | OUTPATIENT
Start: 2021-09-24 | End: 2021-09-26 | Stop reason: HOSPADM

## 2021-09-24 RX ORDER — DOCUSATE SODIUM 100 MG/1
100 CAPSULE, LIQUID FILLED ORAL 2 TIMES DAILY
Status: DISCONTINUED | OUTPATIENT
Start: 2021-09-24 | End: 2021-09-26 | Stop reason: HOSPADM

## 2021-09-24 RX ORDER — SODIUM CHLORIDE, SODIUM LACTATE, POTASSIUM CHLORIDE, CALCIUM CHLORIDE 600; 310; 30; 20 MG/100ML; MG/100ML; MG/100ML; MG/100ML
125 INJECTION, SOLUTION INTRAVENOUS CONTINUOUS
Status: DISCONTINUED | OUTPATIENT
Start: 2021-09-24 | End: 2021-09-26 | Stop reason: HOSPADM

## 2021-09-24 RX ORDER — OXYTOCIN/RINGER'S LACTATE 30/500 ML
87.3 PLASTIC BAG, INJECTION (ML) INTRAVENOUS AS NEEDED
Status: DISCONTINUED | OUTPATIENT
Start: 2021-09-24 | End: 2021-09-26 | Stop reason: HOSPADM

## 2021-09-24 RX ORDER — DIPHENHYDRAMINE HCL 25 MG
25 CAPSULE ORAL
Status: DISCONTINUED | OUTPATIENT
Start: 2021-09-24 | End: 2021-09-26 | Stop reason: HOSPADM

## 2021-09-24 RX ORDER — OXYCODONE HYDROCHLORIDE 5 MG/1
5 TABLET ORAL
Status: DISCONTINUED | OUTPATIENT
Start: 2021-09-24 | End: 2021-09-26 | Stop reason: HOSPADM

## 2021-09-24 RX ORDER — ZOLPIDEM TARTRATE 5 MG/1
5 TABLET ORAL
Status: DISCONTINUED | OUTPATIENT
Start: 2021-09-24 | End: 2021-09-26 | Stop reason: HOSPADM

## 2021-09-24 RX ORDER — OXYTOCIN/RINGER'S LACTATE 30/500 ML
10 PLASTIC BAG, INJECTION (ML) INTRAVENOUS AS NEEDED
Status: DISCONTINUED | OUTPATIENT
Start: 2021-09-24 | End: 2021-09-26 | Stop reason: HOSPADM

## 2021-09-24 RX ORDER — SODIUM CHLORIDE 0.9 % (FLUSH) 0.9 %
5-40 SYRINGE (ML) INJECTION EVERY 8 HOURS
Status: DISCONTINUED | OUTPATIENT
Start: 2021-09-24 | End: 2021-09-25

## 2021-09-24 RX ORDER — ENOXAPARIN SODIUM 100 MG/ML
60 INJECTION SUBCUTANEOUS EVERY 12 HOURS
Status: DISCONTINUED | OUTPATIENT
Start: 2021-09-24 | End: 2021-09-25

## 2021-09-24 RX ADMIN — IBUPROFEN 800 MG: 800 TABLET, FILM COATED ORAL at 14:25

## 2021-09-24 RX ADMIN — AMPICILLIN SODIUM 2 G: 2 INJECTION, POWDER, FOR SOLUTION INTRAMUSCULAR; INTRAVENOUS at 15:49

## 2021-09-24 RX ADMIN — KETOROLAC TROMETHAMINE 30 MG: 30 INJECTION, SOLUTION INTRAMUSCULAR; INTRAVENOUS at 02:30

## 2021-09-24 RX ADMIN — CLINDAMYCIN PHOSPHATE 900 MG: 900 INJECTION, SOLUTION INTRAVENOUS at 02:29

## 2021-09-24 RX ADMIN — OXYCODONE 5 MG: 5 TABLET ORAL at 11:05

## 2021-09-24 RX ADMIN — CLINDAMYCIN PHOSPHATE 900 MG: 900 INJECTION, SOLUTION INTRAVENOUS at 11:05

## 2021-09-24 RX ADMIN — SODIUM CHLORIDE, POTASSIUM CHLORIDE, SODIUM LACTATE AND CALCIUM CHLORIDE 125 ML/HR: 600; 310; 30; 20 INJECTION, SOLUTION INTRAVENOUS at 06:44

## 2021-09-24 RX ADMIN — ENOXAPARIN SODIUM 60 MG: 100 INJECTION SUBCUTANEOUS at 19:00

## 2021-09-24 RX ADMIN — DOCUSATE SODIUM 100 MG: 100 CAPSULE ORAL at 11:05

## 2021-09-24 RX ADMIN — AMPICILLIN SODIUM 2 G: 2 INJECTION, POWDER, FOR SOLUTION INTRAMUSCULAR; INTRAVENOUS at 23:32

## 2021-09-24 RX ADMIN — CLINDAMYCIN PHOSPHATE 900 MG: 900 INJECTION, SOLUTION INTRAVENOUS at 22:26

## 2021-09-24 RX ADMIN — OXYCODONE 5 MG: 5 TABLET ORAL at 14:25

## 2021-09-24 RX ADMIN — IBUPROFEN 800 MG: 800 TABLET, FILM COATED ORAL at 22:21

## 2021-09-24 RX ADMIN — AMPICILLIN SODIUM 2 G: 2 INJECTION, POWDER, FOR SOLUTION INTRAMUSCULAR; INTRAVENOUS at 06:44

## 2021-09-24 RX ADMIN — GENTAMICIN SULFATE 440 MG: 40 INJECTION, SOLUTION INTRAMUSCULAR; INTRAVENOUS at 19:18

## 2021-09-24 NOTE — PROGRESS NOTES
1905: Bedside shift change report given to [facility] RN/Paula RN (oncoming nurse) by Jordon Peña RN (offgoing nurse). Report included the following information SBAR, Kardex, Intake/Output, MAR, Recent Results and Med Rec Status. 1916: Infant delivered via primary C/S     1918: Manual removal of placenta    1953: Patient and infant transferred back to room from OR. Recovery started. 2153: C/s Recovery ended. Waiting to transfer patient. 2230: TRANSFER - OUT REPORT:    Verbal report given to Curtis Livingston RN(name) on Micheal Slater  being transferred to MIU(unit) for routine progression of care       Report consisted of patients Situation, Background, Assessment and   Recommendations(SBAR). Information from the following report(s) SBAR, Kardex, Procedure Summary, Intake/Output, MAR and Recent Results was reviewed with the receiving nurse. Lines:   Peripheral IV 09/22/21 Left;Posterior Forearm (Active)   Site Assessment Clean, dry, & intact 09/23/21 1928   Phlebitis Assessment 0 09/23/21 1928   Infiltration Assessment 0 09/23/21 1928   Dressing Status Clean, dry, & intact 09/23/21 1928   Dressing Type Tape;Transparent 09/23/21 1928   Hub Color/Line Status Pink; Infusing 09/23/21 1928   Action Taken Blood drawn 09/22/21 1845        Opportunity for questions and clarification was provided.       Patient transported with:   Registered Nurse

## 2021-09-24 NOTE — LACTATION NOTE
This note was copied from a baby's chart. This is mother's first baby. Her baby had low blood sugars and was given glucose and formula earlier which was medically indicated. He is LGA. Baby had a blood sugar drawn prior to this feeding which was  59. Mother put baby to breast w/LC assistance. Baby was sleepy but LC able to wake him and he did latch onto left breast in football hold and suckled on/off for 10 minutes. Baby had several good sucking bursts and mother able to feel good pulls and tugs. Baby then became sleepy and formula was given. He took 18 ml well with chin support which improved his suck. [address] [place] instructed mother to pump TID to help stimulate her breast milk supply - symphony pump set up and instructions for use given. LC reviewed storage and preparation of expressed breast milk for baby. LC reinforced keeping baby warm and wrapped / skin to skin also encouraged. Discussed with mother her plan for feeding. Reviewed the benefits of exclusive breast milk feeding during the hospital stay. Informed her of the risks of using formula to supplement in the first few days of life as well as the benefits of successful breast milk feeding; referred her to the Breastfeeding booklet about this information. She acknowledges understanding of information reviewed and states that it is her plan to breast/formula feed her infant. Will support her choice and offer additional information as needed. Encouraged mom to attempt feeding with baby led feeding cues. Just as sucking on fingers, rooting, mouthing. Looking for 8-12 feedings in 24 hours. Don't limit baby at breast, allow baby to come of breast on it's own. Baby may want to feed  often and may increase number of feedings on second day of life. Skin to skin encouraged.       If baby doesn't nurse,  Mom should  hand express  10-20 drops of colostrum and drip into baby's mouth, or give to baby by finger feeding, cup feeding, or spoon feeding at least every 2-3 hours. Pumping:  Guidelines for pumping, milk collection and storage, proper cleaning of pump parts all reviewed. How to establish and maintain breast milk supply through pumping reviewed. Differences between hospital grade rental pumps vs store bought double electric/hand pumps discussed. Set up pumping with double electric set up. Assisted with pump session. List of area pump rental locations and lactation support services provided. Mother will successfully establish breastfeeding by feeding in response to early feeding cues   or wake every 3h, will obtain deep latch, and will keep log of feedings/output. Taught to BF at hunger cues and or q 2-3 hrs and to offer 10-20 drops of hand expressed colostrum at any non-feeds. Breast Assessment  Left Breast: Large  Left Nipple: Everted, Intact, Short  Right Breast: Large  Right Nipple: Everted, Intact, Short  Breast- Feeding Assessment  Attends Breast-Feeding Classes: No  Breast-Feeding Experience: No  Breast Trauma/Surgery: No  Type/Quality: Good  Lactation Consultant Visits  Breast-Feedings: Fair (Baby put to breast. He was sleepy. LC able to wake baby -he latched on and suckled on/off 10 min. on left breast. Effective suck for 5 min. Mom felt good pulls.  Baby the took 18 ml formula w/chin support.)  Mother/Infant Observation  Mother Observation: Alignment, Holds breast, Breast comfortable, Lets baby end feeding, Nipple round on release, Close hold  Infant Observation: Audible swallows, Lips flanged, upper, Opens mouth, Feeding cues, Relaxed after feeding, Frenulum checked, Rhythmic suck, Latches nipple and aereolae, Lips flanged, lower  LATCH Documentation  Latch: Repeated attempts, hold nipple in mouth, stimulate to suck  Audible Swallowing: A few with stimulation  Type of Nipple: Everted (after stimulation)  Comfort (Breast/Nipple): Soft/non-tender  Hold (Positioning): Full assist, teach one side, mother does other, staff holds  [facility] Score: 7    Mother given breastfeeding handouts and LC#. Encouraged mother to call [facility] for any feeding concerns.

## 2021-09-24 NOTE — PROGRESS NOTES
Post-Operative  Day 1    Thom Nunez     Assessment: Post-Op day 1, stable. Presumed chorioamnionitis, on Amp/gent/clinda. Afebrile since 1735 yesterday. Plan:   1. Routine post-operative care   2. Will continue antibiotics until 24 hours afebrile and then will discontinue    Information for the patient's :  Pablo Breed, Male Rashaad Rivas [[id number]]   , Low Transverse    Patient doing well without significant complaint. Nausea and vomiting resolved, tolerating liquids, no flatus, ventura in place. Vitals:  Visit Vitals  /66 (BP 1 Location: Right upper arm, BP Patient Position: Sitting; At rest)   Pulse (!) 110 Comment: pt talking   Temp 98.3 °F (36.8 °C)   Resp 18   Ht 5' 9\" (1.753 m)   Wt 124.3 kg (274 lb)   SpO2 96%   Breastfeeding Unknown   BMI 40.46 kg/m²     Temp (24hrs), Av.8 °F (37.1 °C), Min:97.7 °F (36.5 °C), Max:101.4 °F (38.6 °C)      Last 24hr Input/Output:    Intake/Output Summary (Last 24 hours) at 2021 1250  Last data filed at 2021 1050  Gross per 24 hour   Intake 1800 ml   Output 2655 ml   Net -855 ml          Exam:        Patient without distress. Lungs clear. Abdomen, bowel sounds present, soft, expected tenderness, fundus firm Wound dressing intact     Perineum normal lochia noted               Lower extremities are negative for swelling, cords or tenderness.     Labs:   Lab Results   Component Value Date/Time    WBC 16.4 (H) 2021 03:25 AM    WBC 15.7 (H) 2021 05:50 PM    WBC 8.7 2021 07:07 PM    WBC 5.8 2020 12:00 AM    WBC 7.7 2019 08:56 AM    WBC 9.1 05/15/2018 12:00 AM    WBC 7.8 2017 12:00 AM    WBC 8.1 2016 11:06 AM    WBC 6.7 2014 10:19 AM    HGB 8.1 (L) 2021 03:25 AM    HGB 9.3 (L) 2021 05:50 PM    HGB 9.4 (L) 2021 07:07 PM    HGB 13.1 2020 12:00 AM    HGB 12.7 2019 08:56 AM    HGB 12.3 05/15/2018 12:00 AM    HGB 13.0 2017 12:00 AM    HGB 13.7 2016 11:06 AM    HGB 14.4 05/22/2014 10:19 AM    HCT 25.6 (L) 09/24/2021 03:25 AM    HCT 29.3 (L) 09/23/2021 05:50 PM    HCT 30.2 (L) 09/22/2021 07:07 PM    HCT 39.7 02/25/2020 12:00 AM    HCT 37.8 02/12/2019 08:56 AM    HCT 38.5 05/15/2018 12:00 AM    HCT 38.8 07/06/2017 12:00 AM    HCT 40.1 02/11/2016 11:06 AM    HCT 43.3 05/22/2014 10:19 AM    PLATELET 951 78/96/8715 03:25 AM    PLATELET 416 12/09/9024 05:50 PM    PLATELET 597 59/44/3990 07:07 PM    PLATELET 346 03/86/9496 12:00 AM    PLATELET 654 18/77/9393 08:56 AM    PLATELET 813 91/02/8426 12:00 AM    PLATELET 114 47/65/3235 12:00 AM    PLATELET 890 55/49/3653 11:06 AM    PLATELET 847 67/67/5968 10:19 AM       Recent Results (from the past 24 hour(s))   CBC WITH AUTOMATED DIFF    Collection Time: 09/23/21  5:50 PM   Result Value Ref Range    WBC 15.7 (H) 3.6 - 11.0 K/uL    RBC 3.99 3.80 - 5.20 M/uL    HGB 9.3 (L) 11.5 - 16.0 g/dL    HCT 29.3 (L) 35.0 - 47.0 %    MCV 73.4 (L) 80.0 - 99.0 FL    MCH 23.3 (L) 26.0 - 34.0 PG    MCHC 31.7 30.0 - 36.5 g/dL    RDW 15.3 (H) 11.5 - 14.5 %    PLATELET 321 197 - 008 K/uL    MPV 12.2 8.9 - 12.9 FL    NRBC 0.0 0  WBC    ABSOLUTE NRBC 0.00 0.00 - 0.01 K/uL    NEUTROPHILS 86 (H) 32 - 75 %    LYMPHOCYTES 7 (L) 12 - 49 %    MONOCYTES 6 5 - 13 %    EOSINOPHILS 0 0 - 7 %    BASOPHILS 0 0 - 1 %    IMMATURE GRANULOCYTES 1 (H) 0.0 - 0.5 %    ABS. NEUTROPHILS 13.5 (H) 1.8 - 8.0 K/UL    ABS. LYMPHOCYTES 1.1 0.8 - 3.5 K/UL    ABS. MONOCYTES 1.0 0.0 - 1.0 K/UL    ABS. EOSINOPHILS 0.0 0.0 - 0.4 K/UL    ABS. BASOPHILS 0.0 0.0 - 0.1 K/UL    ABS. IMM.  GRANS. 0.1 (H) 0.00 - 0.04 K/UL    DF AUTOMATED     METABOLIC PANEL, BASIC    Collection Time: 09/23/21  5:50 PM   Result Value Ref Range    Sodium 137 136 - 145 mmol/L    Potassium 3.9 3.5 - 5.1 mmol/L    Chloride 107 97 - 108 mmol/L    CO2 21 21 - 32 mmol/L    Anion gap 9 5 - 15 mmol/L    Glucose 81 65 - 100 mg/dL    BUN 10 6 - 20 MG/DL    Creatinine 0.55 0.55 - 1.02 MG/DL BUN/Creatinine ratio 18 12 - 20      GFR est AA >60 >60 ml/min/1.73m2    GFR est non-AA >60 >60 ml/min/1.73m2    Calcium 8.7 8.5 - 10.1 MG/DL   TYPE & SCREEN    Collection Time: 09/23/21  5:50 PM   Result Value Ref Range    Crossmatch Expiration 09/26/2021,2359     ABO/Rh(D) A POSITIVE     Antibody screen NEG    CBC WITH AUTOMATED DIFF    Collection Time: 09/24/21  3:25 AM   Result Value Ref Range    WBC 16.4 (H) 3.6 - 11.0 K/uL    RBC 3.48 (L) 3.80 - 5.20 M/uL    HGB 8.1 (L) 11.5 - 16.0 g/dL    HCT 25.6 (L) 35.0 - 47.0 %    MCV 73.6 (L) 80.0 - 99.0 FL    MCH 23.3 (L) 26.0 - 34.0 PG    MCHC 31.6 30.0 - 36.5 g/dL    RDW 15.5 (H) 11.5 - 14.5 %    PLATELET 386 463 - 706 K/uL    MPV 11.7 8.9 - 12.9 FL    NRBC 0.0 0  WBC    ABSOLUTE NRBC 0.00 0.00 - 0.01 K/uL    NEUTROPHILS 82 (H) 32 - 75 %    LYMPHOCYTES 11 (L) 12 - 49 %    MONOCYTES 6 5 - 13 %    EOSINOPHILS 0 0 - 7 %    BASOPHILS 0 0 - 1 %    IMMATURE GRANULOCYTES 1 (H) 0.0 - 0.5 %    ABS. NEUTROPHILS 13.4 (H) 1.8 - 8.0 K/UL    ABS. LYMPHOCYTES 1.8 0.8 - 3.5 K/UL    ABS. MONOCYTES 1.0 0.0 - 1.0 K/UL    ABS. EOSINOPHILS 0.0 0.0 - 0.4 K/UL    ABS. BASOPHILS 0.0 0.0 - 0.1 K/UL    ABS. IMM.  GRANS. 0.1 (H) 0.00 - 0.04 K/UL    DF AUTOMATED

## 2021-09-24 NOTE — PROGRESS NOTES
1905: Bedside and Verbal shift change report given to MELINDA Levin RN and KESHAWN Mendez RN (oncoming nurse) by Aaron Pastor RN (offgoing nurse). Report included the following information SBAR, Kardex, Intake/Output, MAR, Recent Results and Med Rec Status. This RN orienting KESHAWN Mednez RN.

## 2021-09-24 NOTE — ROUTINE PROCESS
Bedside and Verbal shift change report given to Suellen Duvall (oncoming nurse) by Bryan Carter (offgoing nurse). Report given with SBAR, Kardex, Intake/Output and MAR.

## 2021-09-24 NOTE — OP NOTES
[facility]  OPERATIVE REPORT    Name:  Azra Almeida  MR#:  [id number]  :  1985  ACCOUNT #:  [de-identified]  DATE OF SERVICE:  2021    PREOPERATIVE DIAGNOSES:  1. Repetitive late fetal heart decelerations and fetal tachycardia. 2.  Chorioamnionitis. 3.  Polyhydramnios. 4.  Maternal BMI of 40.  5.  40 weeks gestation. POSTOPERATIVE DIAGNOSES:  1. Repetitive late fetal heart decelerations and fetal tachycardia. 2.  Chorioamnionitis. 3.  Polyhydramnios. 4.  Maternal BMI of 40.  5.  40 weeks gestation. 6.  Live male infant. PROCEDURE PERFORMED:  Primary  delivery through a modified Maylard transverse low abdominal incision, transverse lower uterine segment incision. SURGEON:  Vira Rangel MD    ASSISTANT:  None. ANESTHESIA:  Epidural.    COMPLICATIONS:  None. SPECIMENS REMOVED:  None. Placenta appeared normal, not sent to Pathology. IMPLANTS:  None. ESTIMATED BLOOD LOSS:  1000 mL and the QBL was 805 mL. BIRTH:  A 4420 g or 9 pounds 12 ounces live male infant. Apgars 9 and 9. DRAINS:  Munoz catheter to straight drainage. PROCEDURE:  We went back to the operating room. Epidural catheter was dosed. She was prepped and draped in the usual sterile manner. I made a low transverse skin incision with the scalpel and incised subcutaneous layer, anterior fascia, medial half of the rectus abdominis muscles, and parietal peritoneum transversely. Identified the lower uterine segment, incised the visceral peritoneum and then the myometrium with about a centimeter above the reflection of the bladder. Extended the incision with blunt, cephalad, caudal blunt dissection. Clear amniotic fluid was released. I delivered the baby's head and then shoulders came smoothly. There was good, prompt respiratory effort, color, tone, and nuchal cord x2. We waited one minute before clamping and cutting the cord.     I expressed the placenta from the intrauterine cavity, cleaned the intrauterine cavity, closed the myometrium using a running 0 Vicryl, followed by a running imbricating 0 Vicryl and I ligated the uterine artery on the patient's left side just below the incision to get good hemostasis. Uterus, tubes and ovaries were normal.    I closed the parietal peritoneum using a 2-0 Vicryl. I closed the fascia using a looped 0 PDS. I closed subcu layer with 2-0 Vicryl after irrigating with saline and 4-0 Monocryl subcuticular suture on the skin and Dermabond glue and a dressing. Sponge, instrument and needle counts were correct. She went to the recovery room in good condition.       Moira Castañeda MD      [id number]  D:  09/23/2021 20:24  T:  09/24/2021 6:00  JOB #:  [id number]

## 2021-09-24 NOTE — ANESTHESIA POSTPROCEDURE EVALUATION
Procedure(s):   SECTION. epidural    Anesthesia Post Evaluation      Multimodal analgesia: multimodal analgesia used between 6 hours prior to anesthesia start to PACU discharge  Patient location during evaluation: PACU  Patient participation: complete - patient participated  Level of consciousness: awake and alert  Pain management: adequate  Airway patency: patent  Anesthetic complications: no  Cardiovascular status: acceptable  Respiratory status: acceptable  Hydration status: acceptable  Post anesthesia nausea and vomiting:  none  Final Post Anesthesia Temperature Assessment:  Normothermia (36.0-37.5 degrees C)      INITIAL Post-op Vital signs:   Vitals Value Taken Time   /56 21   Temp 37 °C (98.6 °F) 21   Pulse 105 21   Resp 18 21   SpO2 97 % 21   Vitals shown include unvalidated device data.

## 2021-09-24 NOTE — PROGRESS NOTES
Labor Progress Note  Patient seen, fetal heart rate and contraction pattern evaluated, patient examined. Patient Vitals for the past 1 hrs:   BP Temp Pulse Resp SpO2   09/23/21 2003     99 %   09/23/21 1958     97 %   09/23/21 1957 (!) 113/57 98.4 °F (36.9 °C) (!) 125 14 100 %   09/23/21 1953 (!) 102/51 98.2 °F (36.8 °C) (!) 135 16 99 %       Physical Exam:  Cervical Exam:  8/100/-1 to 0 }  Membranes:  Absent s/p AROM  Uterine Activity:every 3 minutues  Fetal Heart Rate: reactive     Assessment/Plan:  D/W pt and her  that with this cervical dilation the head seems to be high . We will closely watch for head descent and if needed C section .    USG shows BPD 99%tile and good size baby   They voiced understanding     Mike Goldstein MD

## 2021-09-24 NOTE — BRIEF OP NOTE
Brief Postoperative Note    Patient: Marylou Lange  YOB: 1985  MRN: [id number]    Date of Procedure: 2021     Pre-Op Diagnosis: Fetal intolerance to labor, delivered, current hospitalization [O77.9], chorioamnionitis, polyhydramnios, bmi 40 , 40 weeks    Post-Op Diagnosis: Same + LMI    Procedure(s):maylard, tlus   SECTION    Surgeon(s):  Mayra Alvarez MD    Surgical Assistant: Surg Asst-1: Jacklyn ISRAEL    Anesthesia: Epidural     Estimated Blood Loss (mL): 1644    Complications: None    Specimens: none  Implants:none  Drains: Munoz  Findings: LMI  Dictation: [id number]    Electronically Signed by Low Stevens MD on 2021 at 8:24 PM

## 2021-09-25 LAB
BASOPHILS # BLD: 0 K/UL (ref 0–0.1)
BASOPHILS NFR BLD: 0 % (ref 0–1)
DIFFERENTIAL METHOD BLD: ABNORMAL
EOSINOPHIL # BLD: 0.1 K/UL (ref 0–0.4)
EOSINOPHIL NFR BLD: 1 % (ref 0–7)
ERYTHROCYTE [DISTWIDTH] IN BLOOD BY AUTOMATED COUNT: 15.4 % (ref 11.5–14.5)
HCT VFR BLD AUTO: 23.8 % (ref 35–47)
HGB BLD-MCNC: 7.4 G/DL (ref 11.5–16)
IMM GRANULOCYTES # BLD AUTO: 0.1 K/UL (ref 0–0.04)
IMM GRANULOCYTES NFR BLD AUTO: 1 % (ref 0–0.5)
LYMPHOCYTES # BLD: 2.5 K/UL (ref 0.8–3.5)
LYMPHOCYTES NFR BLD: 20 % (ref 12–49)
MCH RBC QN AUTO: 23.5 PG (ref 26–34)
MCHC RBC AUTO-ENTMCNC: 31.1 G/DL (ref 30–36.5)
MCV RBC AUTO: 75.6 FL (ref 80–99)
MONOCYTES # BLD: 0.8 K/UL (ref 0–1)
MONOCYTES NFR BLD: 6 % (ref 5–13)
NEUTS SEG # BLD: 8.9 K/UL (ref 1.8–8)
NEUTS SEG NFR BLD: 72 % (ref 32–75)
NRBC # BLD: 0 K/UL (ref 0–0.01)
NRBC BLD-RTO: 0 PER 100 WBC
PLATELET # BLD AUTO: 245 K/UL (ref 150–400)
PMV BLD AUTO: 11.9 FL (ref 8.9–12.9)
RBC # BLD AUTO: 3.15 M/UL (ref 3.8–5.2)
WBC # BLD AUTO: 12.3 K/UL (ref 3.6–11)

## 2021-09-25 PROCEDURE — 74011250637 HC RX REV CODE- 250/637: Performed by: OBSTETRICS & GYNECOLOGY

## 2021-09-25 PROCEDURE — 74011250636 HC RX REV CODE- 250/636: Performed by: OBSTETRICS & GYNECOLOGY

## 2021-09-25 PROCEDURE — 85025 COMPLETE CBC W/AUTO DIFF WBC: CPT

## 2021-09-25 PROCEDURE — 65270000029 HC RM PRIVATE

## 2021-09-25 PROCEDURE — 36415 COLL VENOUS BLD VENIPUNCTURE: CPT

## 2021-09-25 PROCEDURE — 74011000258 HC RX REV CODE- 258: Performed by: OBSTETRICS & GYNECOLOGY

## 2021-09-25 RX ORDER — ENOXAPARIN SODIUM 100 MG/ML
60 INJECTION SUBCUTANEOUS EVERY 24 HOURS
Status: DISCONTINUED | OUTPATIENT
Start: 2021-09-26 | End: 2021-09-26 | Stop reason: HOSPADM

## 2021-09-25 RX ORDER — ENOXAPARIN SODIUM 100 MG/ML
60 INJECTION SUBCUTANEOUS EVERY 24 HOURS
Status: DISCONTINUED | OUTPATIENT
Start: 2021-09-26 | End: 2021-09-25

## 2021-09-25 RX ADMIN — OXYCODONE 10 MG: 5 TABLET ORAL at 14:25

## 2021-09-25 RX ADMIN — DOCUSATE SODIUM 100 MG: 100 CAPSULE ORAL at 09:17

## 2021-09-25 RX ADMIN — CLINDAMYCIN PHOSPHATE 900 MG: 900 INJECTION, SOLUTION INTRAVENOUS at 05:51

## 2021-09-25 RX ADMIN — IBUPROFEN 800 MG: 800 TABLET, FILM COATED ORAL at 22:46

## 2021-09-25 RX ADMIN — ENOXAPARIN SODIUM 60 MG: 100 INJECTION SUBCUTANEOUS at 10:59

## 2021-09-25 RX ADMIN — Medication 10 ML: at 06:30

## 2021-09-25 RX ADMIN — AMPICILLIN SODIUM 2 G: 2 INJECTION, POWDER, FOR SOLUTION INTRAMUSCULAR; INTRAVENOUS at 06:30

## 2021-09-25 RX ADMIN — IBUPROFEN 800 MG: 800 TABLET, FILM COATED ORAL at 14:25

## 2021-09-25 RX ADMIN — OXYCODONE 10 MG: 5 TABLET ORAL at 22:47

## 2021-09-25 RX ADMIN — IBUPROFEN 800 MG: 800 TABLET, FILM COATED ORAL at 06:30

## 2021-09-25 NOTE — ROUTINE PROCESS
Bedside and Verbal shift change report given to Nasim Kincaid (oncoming nurse) by Colleen Garzon. Giovanni Stewart (offgoing nurse). Report given with SBAR, Kardex, Intake/Output and MAR.

## 2021-09-25 NOTE — LACTATION NOTE
This note was copied from a baby's chart. Mother states baby has been nursing well and frequently - He cluster fed last night and this morning. Baby was in nursery for circumcision at time of visit. Mother states she pumped just prior to visit and got 5 ml which she plans to give to baby post circumcision if he is sleepy - instructed mother to offer baby breast first.     Reviewed breastfeeding basics:  Supply and demand, breastfeed baby 8-12 times in 24 hr, feed on demand,  stomach size, early  Feeding cues, skin to skin, positioning and baby led latch-on, assymetrical latch with signs of good, deep latch vs shallow, feeding frequency and duration, and log sheet for tracking infant feedings and output. Breastfeeding Booklet and Warm line information given. Discussed typical  weight loss and the importance of infant weight checks with pediatrician 1-2 post discharge. Reviewed breastfeeding basics:  Supply and demand,  stomach size, early  Feeding cues, skin to skin, positioning and baby led latch-on, assymetrical latch with signs of good, deep latch vs shallow, feeding frequency and duration, and log sheet for tracking infant feedings and output. Breastfeeding Booklet and Warm line information given. Discussed typical  weight loss and the importance of infant weight checks with pediatrician 1-2 post discharge. Care for sore/tender nipples discussed:  ways to improve positioning and latch practiced and discussed, hand express colostrum after feedings and let air dry, light application of lanolin, hydrogel pads, seek comfortable laid back feeding position, start feedings on least sore side first.  Mother has a [employer] pump for home use - reviewed storage and preparation of expressed breast milk for baby.      Mother will successfully establish breastfeeding by feeding in response to early feeding cues   or wake every 3h, will obtain deep latch, and will keep log of feedings/output. Taught to BF at hunger cues and or q 2-3 hrs and to offer 10-20 drops of hand expressed colostrum at any non-feeds. Breast Assessment  Left Breast: Large  Left Nipple: Everted, Intact, Short  Right Breast: Large  Right Nipple: Everted, Intact, Short, Tender (small red area on tip of nipple)  Breast- Feeding Assessment  Attends Breast-Feeding Classes: No  Breast-Feeding Experience: No  Breast Trauma/Surgery: No  Type/Quality: Good (Per mother)  Lactation Consultant Visits  Breast-Feedings:  (Baby in nursery for circumcision. Mother states baby has been nursing well and cluster fed last night. He last breast fed at 0930 for 20 minutes.)    Instructed mother to call [facility] for breastfeeding assistance.

## 2021-09-25 NOTE — PROGRESS NOTES
1115- Patient called out stating current iv of antibiotics is leaking and her arm is swelling. Iv site discontinued due to infiltration. Attempts to restrat iv site unsuccessful. Charge nurse attempt to restart iv site unsuccessful. Labor charge nurse coming up to restart iv site. 1300- Dr Tomi Marr in room to see patient. Informed of delay in antibiotics administration schedule due to difficulty establishing new site. Dr Tomi Marr verbalized understanding. 1520- Iv site obtained by labor charge nurse Maribeth Crisostomo. Iv antibiotics restarted.

## 2021-09-25 NOTE — ROUTINE PROCESS
Patient transferred from 315 to open room 303 per leadership request due to need of room 315 for critical care patient.

## 2021-09-26 VITALS
WEIGHT: 274 LBS | HEIGHT: 69 IN | TEMPERATURE: 97.6 F | HEART RATE: 91 BPM | SYSTOLIC BLOOD PRESSURE: 110 MMHG | BODY MASS INDEX: 40.58 KG/M2 | DIASTOLIC BLOOD PRESSURE: 69 MMHG | RESPIRATION RATE: 16 BRPM | OXYGEN SATURATION: 96 %

## 2021-09-26 PROCEDURE — 74011250637 HC RX REV CODE- 250/637: Performed by: OBSTETRICS & GYNECOLOGY

## 2021-09-26 PROCEDURE — 2709999900 HC NON-CHARGEABLE SUPPLY

## 2021-09-26 RX ORDER — IBUPROFEN 800 MG/1
800 TABLET ORAL
Qty: 30 TABLET | Refills: 1 | Status: SHIPPED | OUTPATIENT
Start: 2021-09-26 | End: 2021-12-16 | Stop reason: ALTCHOICE

## 2021-09-26 RX ORDER — DOCUSATE SODIUM 100 MG/1
100 CAPSULE, LIQUID FILLED ORAL 2 TIMES DAILY
Qty: 30 CAPSULE | Refills: 0 | Status: SHIPPED | OUTPATIENT
Start: 2021-09-26 | End: 2021-10-11

## 2021-09-26 RX ORDER — OXYCODONE AND ACETAMINOPHEN 5; 325 MG/1; MG/1
1 TABLET ORAL
Qty: 20 TABLET | Refills: 0 | Status: SHIPPED | OUTPATIENT
Start: 2021-09-26 | End: 2021-10-01

## 2021-09-26 RX ADMIN — OXYCODONE 10 MG: 5 TABLET ORAL at 06:26

## 2021-09-26 RX ADMIN — OXYCODONE 5 MG: 5 TABLET ORAL at 14:08

## 2021-09-26 RX ADMIN — IBUPROFEN 800 MG: 800 TABLET, FILM COATED ORAL at 14:08

## 2021-09-26 RX ADMIN — IBUPROFEN 800 MG: 800 TABLET, FILM COATED ORAL at 06:26

## 2021-09-26 NOTE — DISCHARGE INSTRUCTIONS
Section: What to Expect at Home    Your Recovery:  A  section, or , is surgery to deliver your baby through a cut, called an incision that the doctor makes in your lower belly and uterus. You may have some pain in your lower belly and need pain medicine for 1 to 2 weeks. You can expect some vaginal bleeding for several weeks. You will probably need about 6 weeks to fully recover. It is important to take it easy while the incision is healing. Avoid heavy lifting, strenuous activities, or exercises that strain the belly muscles while you are recovering. Ask a family member or friend for help with housework, cooking, and shopping. This care sheet gives you a general idea about how long it will take for you to recover. But each person recovers at a different pace. Follow the steps below to get better as quickly as possible. How can you care for yourself at home? Activity  · Rest when you feel tired. Getting enough sleep will help you recover. · Try to walk each day. Start by walking a little more than you did the day before. Bit by bit, increase the amount you walk. Walking boosts blood flow and helps prevent pneumonia, constipation, and blood clots. · Avoid strenuous activities, such as bicycle riding, jogging, weightlifting, and aerobic exercise, for 6 weeks or until your doctor says it is okay. · Until your doctor says it is okay, do not lift anything heavier than your baby. · Do not do sit-ups or other exercise that strain the belly muscles for 6 weeks or until your doctor says it is okay. · Hold a pillow over your incision when you cough or take deep breaths. This will support your belly and decrease your pain. · You may shower as usual. Pat the incision dry when you are done. · You will have some vaginal bleeding. Wear sanitary pads. Do not douche or use tampons until your doctor says it is okay. · Ask your doctor when you can drive again.   · You will probably need to take at least 6 weeks off work. It depends on the type of work you do and how you feel. · Wait until you are healed (about 4 to 6 weeks) before you have sexual intercourse. Your doctor will tell you when it is okay to have sex. · Talk to your doctor about birth control. You can get pregnant even before your period returns. Also, you can get pregnant while you are breast-feeding. Incision care  Your skin is your body's first line of defense against germs, but an incision site leaves an easy way for germs to enter your body. To prevent infection:  · Clean your hands frequently and before and after changing any touching any dressings. · If you have strips of tape on the incision, leave the tape on for a week or until it falls off. · Look at your incision closely every day for any changes. Contact your doctor if you experience any signs of infection, such as fever or increased redness at the surgical site. · Wash the area daily with warm, soapy water, and pat it dry. Don't use hydrogen peroxide or alcohol, which can slow healing. You may cover the area with a gauze bandage if it weeps or rubs against clothing. Change the bandage every day. · Keep the area clean and dry. Diet  · You can eat your normal diet. If your stomach is upset, try bland, low-fat foods like plain rice, broiled chicken, toast, and yogurt. · Drink plenty of fluids (unless your doctor tells you not to). · You may notice that your bowel movements are not regular right after your surgery. This is common. Try to avoid constipation and straining with bowel movements. You may want to take a fiber supplement every day. If you have not had a bowel movement after a couple of days, ask your doctor about taking a mild laxative. · If you are breast-feeding, do not drink any alcohol. Medicines  · Take pain medicines exactly as directed. · If the doctor gave you a prescription medicine for pain, take it as prescribed.   · If you are not taking a prescription pain medicine, ask your doctor if you can take an over-the-counter medicine such as acetaminophen (Tylenol), ibuprofen (Advil, Motrin), or naproxen (Aleve), for cramps. Read and follow all instructions on the label. Do not take aspirin, because it can cause more bleeding. Do not take acetaminophen (Tylenol) and other acetaminophen containing medications (i.e. Percocet) at the same time. · If you think your pain medicine is making you sick to your stomach:  · Take your medicine after meals (unless your doctor has told you not to). · Ask your doctor for a different pain medicine. · If your doctor prescribed antibiotics, take them as directed. Do not stop taking them just because you feel better. You need to take the full course of antibiotics. Mental Health  · Many women get the \"baby blues\" during the first few days after childbirth. You may lose sleep, feel irritable, and cry easily. You may feel happy one minute and sad the next. Hormone changes are one cause of these emotional changes. Also, the demands of a new baby, along with visits from relatives or other family needs, add to a mother's stress. The \"baby blues\" often peak around the fourth day. Then they ease up in less than 2 weeks. · If your moodiness or anxiety lasts for more than 2 weeks, or if you feel like life is not worth living, you may have postpartum depression. This is different for each mother. Some mothers with serious depression may worry intensely about their infant's well-being. Others may feel distant from their child. Some mothers might even feel that they might harm their baby. A mother may have signs of paranoia, wondering if someone is watching her. · With all the changes in your life, you may not know if you are depressed. Pregnancy sometimes causes changes in how you feel that are similar to the symptoms of depression.   · Symptoms of depression include:  · Feeling sad or hopeless and losing interest in daily activities. These are the most common symptoms of depression. · Sleeping too much or not enough. · Feeling tired. You may feel as if you have no energy. · Eating too much or too little. · [employer] ([employer]) offers a Warm line; Chat with the Expert phone sessions; Information and Articles about Pregnancy and Postpartum Mood Disorders; Comprehensive List of Free Support Groups; Knowledgeable local coordinators who will offer support, information, and resources; Guide to Resources on [employer]; Calendar of events in the  mood disorders community; Latest News and Research; and [facility] [address] for [employer]. Remember - You are not alone; You are not to blame; With help, you will be well. 9-853-119-PPD(7930). WWW. POSTPARTUM. NET   · Writing or talking about death, such as writing suicide notes or talking about guns, knives, or pills. Keep the numbers for these national suicide hotlines: 5-989-231-TALK (2-355.962.4541) and 7-744-YKXAMQZ (7-626.506.4482). If you or someone you know talks about suicide or feeling hopeless, get help right away. Other instructions  · If you breast-feed your baby, you may be more comfortable while you are healing if you place the baby so that he or she is not resting on your belly. Try tucking your baby under your arm, with his or her body along the side you will be feeding on. Support your baby's upper body with your arm. With that hand you can control your baby's head to bring his or her mouth to your breast. This is sometimes called the football hold. Follow-up care is a key part of your treatment and safety. Be sure to make and go to all appointments, and call your doctor if you are having problems. It's also a good idea to know your test results and keep a list of the medicines you take. When should you call for help? Call 911 anytime you think you may need emergency care.  For example, call if:  · You are thinking of hurting yourself, your baby, or anyone else. · You passed out (lost consciousness). · You have symptoms of a blood clot in your lung (called a pulmonary embolism). These may include:  · Sudden chest pain. · Trouble breathing. · Coughing up blood. Call your doctor now or seek immediate medical care if:    · You have severe vaginal bleeding. · You are soaking through a pad each hour for 2 or more hours. · Your vaginal bleeding seems to be getting heavier or is still bright red 4 days after delivery. · You are dizzy or lightheaded, or you feel like you may faint. · You are vomiting or cannot keep fluids down. · You have a fever. · You have new or more belly pain. · You have loose stitches, or your incision comes open. · You have symptoms of infection, such as:  · Increased pain, swelling, warmth, or redness. · Red streaks leading from the incision. · Pus draining from the incision. · A fever  · You pass tissue (not just blood). · Your vaginal discharge smells bad. · Your belly feels tender or full and hard. · Your breasts are continuously painful or red. · You feel sad, anxious, or hopeless for more than a few days. · You have sudden, severe pain in your belly. · You have symptoms of a blood clot in your leg (called a deep vein thrombosis), such as:  · Pain in your calf, back of the knee, thigh, or groin. · Redness and swelling in your leg or groin. · You have symptoms of preeclampsia, such as:  · Sudden swelling of your face, hands, or feet. · New vision problems (such as dimness or blurring). · A severe headache. · Your blood pressure is higher than it should be or rises suddenly. · You have new nausea or vomiting. Watch closely for changes in your health, and be sure to contact your doctor if you have any problems.

## 2021-09-26 NOTE — LACTATION NOTE
This note was copied from a baby's chart. Mother and baby for discharge. Baby has been breastfeeding well and frequently. Mother's milk is not in yet - mother reassured. Baby breast fed for 20 minutes just prior to visit. Reviewed breastfeeding basics:  Supply and demand,breastfeed baby 8-12 times in 24 hr., feed on demand,   stomach size, early  Feeding cues, skin to skin, positioning and baby led latch-on, assymetrical latch with signs of good, deep latch vs shallow, feeding frequency and duration, and log sheet for tracking infant feedings and output. Breastfeeding Booklet and Warm line information given. Discussed typical  weight loss and the importance of infant weight checks with pediatrician 1-2 post discharge. Care for sore/tender nipples discussed:  ways to improve positioning and latch practiced and discussed, hand express colostrum after feedings and let air dry, light application of lanolin, hydrogel pads, seek comfortable laid back feeding position, start feedings on least sore side first.    Discussed eating a healthy diet. Instructed mother to eat a variety of foods in order to get a well balanced diet. She should consume an extra 500 calories per day (more than her non-pregnant requirement.) These extra calories will help provide energy needed for optimal breast milk production. Mother also encouraged to \"drink to thirst\" and it is recommended that she drink fluids such as water, fruit/vegetable juice. Nutritious snacks should be available so that she can eat throughout the day to help satisfy her hunger and maintain a good milk supply. Engorgement Care Guidelines:  Reviewed how milk is made and normal phases of milk production. Taught care of engorged breasts - frequent breastfeeding encouraged, cool packs and motrin as tolerated. Anticipatory guidance shared. Reviewed pumping/storage and preparation of expressed breast milk for baby.      Mother will successfully establish breastfeeding by feeding in response to early feeding cues   or wake every 3h, will obtain deep latch, and will keep log of feedings/output. Taught to BF at hunger cues and or q 2-3 hrs and to offer 10-20 drops of hand expressed colostrum at any non-feeds. Breast Assessment  Left Breast: Large  Left Nipple: Everted, Intact, Short  Right Breast: Large  Right Nipple: Everted, Intact, Short, Tender  Breast- Feeding Assessment  Attends Breast-Feeding Classes: No  Breast-Feeding Experience: No  Breast Trauma/Surgery: No  Type/Quality: Good  Lactation Consultant Visits  Breast-Feedings:  (Mother/baby for discharge. Mother states baby just finished nursing and he fed for 20 minutes on left breast and fell asleep afterwards.)    Chart shows numerous feedings, void, stool WNL. Discussed importance of monitoring outputs and feedings on first week of life. Discussed ways to tell if baby is  getting enough breast milk, ie  voids and stools, change in color of stool, and return to birth wt within 2 weeks. Follow up with pediatrician visit for weight check in 1-2 days (per AAP guidelines.)  Encouraged to call Warm Line  [phone]  for any questions/problems that arise.  Mother also given breastfeeding support group dates and times for any future needs

## 2021-09-26 NOTE — DISCHARGE SUMMARY
Obstetrical Discharge Summary     Name: Yun Terrell MRN: [id number]  SSN: [id number]    YOB: 1985  Age: 37 y.o. Sex: female      Admit Date: 2021    Discharge Date: 2021     Admitting Physician: Doroteo Jasso MD     Attending Physician:  Tristan Coe MD     Admission Diagnoses: Normal pregnancy [Z34.90]  Pregnancy [Z34.90]    Discharge Diagnoses:   Information for the patient's :  Marco Bolaños Male Dennis Carvajal [[id number]]   Delivery of a 4.42 kg male infant via , Low Transverse on 2021 at 7:16 PM  by Sandford Litten. Apgars were 9  and 9 . Additional Diagnoses:   Hospital Problems  Date Reviewed: 9/15/2020        Codes Class Noted POA    Body mass index 40.0-44.9, adult ([facility] [address] ICD-10-CM: Z68.41  ICD-9-CM: V85.41  2021 Yes        40 weeks gestation of pregnancy ICD-10-CM: Z3A.40  ICD-9-CM: V22.2  2021 Yes        Maternal care for abnormalities of the fetal heart rate or rhythm, third trimester, not applicable or unspecified ICD-10-CM: Q36.0230  ICD-9-CM: 659.73  2021 No    Overview Addendum 2021  6:38 PM by Jayashree Ramsey MD     The late fhr decelerations are still present, even with the mild contractions less frequent. I recommend a  delivery. I reviewed the benefits and risks, including infection, incsional problems, injury to adjacent organs, bleeding, and medical complications (pneumonia, thrombosis). I answered her questions. She agrees. Chorioamnionitis in third trimester ICD-10-CM: O41.1230  ICD-9-CM: 658.43  2021 No    Overview Addendum 2021  6:39 PM by Jayashree Ramsey MD     Ampicillin   Gentamicin  Clindamycin               Polyhydramnios, third trimester, delivered ICD-10-CM: O40. 3XX0  ICD-9-CM: 657.01  2021 Yes    Overview Signed 2021  5:21 PM by Jayashree Ramsey MD     Slow progress  IUPC placed  Epidural redosed  Will follow              Obesity complicating childbirth ICD-10-CM: T38.556  ICD-9-CM: 649.11  9/22/2021 Yes             Lab Results   Component Value Date/Time    Rubella, External Immune 02/03/2021 12:00 AM    GrBStrep, External Negative 09/01/2021 12:00 AM       Hospital Course: Postpartum course was complicated by chorioamnionitis/maternal fever, which added 1 days to the patient's length of stay. Disposition at Discharge: Home or self care    Discharged Condition: Stable    Patient Instructions:   Current Discharge Medication List      START taking these medications    Details   docusate sodium (COLACE) 100 mg capsule Take 1 Capsule by mouth two (2) times a day for 15 days. Qty: 30 Capsule, Refills: 0      ibuprofen (MOTRIN) 800 mg tablet Take 1 Tablet by mouth every eight (8) hours as needed for Pain. Qty: 30 Tablet, Refills: 1      oxyCODONE-acetaminophen (Percocet) 5-325 mg per tablet Take 1 Tablet by mouth every six (6) hours as needed for Pain for up to 5 days. Max Daily Amount: 4 Tablets. Qty: 20 Tablet, Refills: 0    Associated Diagnoses: Postoperative pain         CONTINUE these medications which have NOT CHANGED    Details   PNV Comb #2-Iron-FA-Omega 3 29-1-400 mg cmpk Take  by mouth. ferrous sulfate (Iron) 325 mg (65 mg iron) tablet Take  by mouth Daily (before breakfast). fluticasone (FLONASE) 50 mcg/actuation nasal spray 2 sprays by Both Nostrils route daily. Qty: 1 Bottle, Refills: 2             Reference my discharge instructions. Follow-up Appointments   Procedures    FOLLOW UP VISIT Appointment in: 6 Weeks Post partum follow up with OB provider in 6 weeks. Post partum follow up with OB provider in 6 weeks.      Standing Status:   Standing     Number of Occurrences:   1     Standing Expiration Date:   9/27/2021     Order Specific Question:   Appointment in     Answer:   Patricia Marino Appointment in: Two Weeks Follow up in 10-14 days for incision check     Follow up in 10-14 days for incision check     Standing Status:   Standing     Number of Occurrences:   1     Standing Expiration Date:   9/27/2021     Order Specific Question:   Appointment in     Answer: Two Weeks        Signed By:  Saadia Myles MD     September 26, 2021                      .

## 2021-09-26 NOTE — PROGRESS NOTES
Pt off unit in stable condition in wheelchair with volunteers for discharge home per Dr. Janneth Lyon . Pt aware of follow up in  1 week for SAIGE dressign and BP check. Rx sent electronically to confirmed pharmacy on file. Patient denies any HA, N/V, pain. Dizziness at this time. Infant in carseat and d/c home with mother.

## 2021-09-26 NOTE — PROGRESS NOTES
Post-Operative  Day 3    Thom Nunez       Assessment: Post-Op day 3, doing well. Afebrile, off abx x 24hr    Plan:   1. Discharge home today  2. Follow up in office in 6 weeks with Gene Goodell, MD  3. Post partum activity/wound care advised, diet as tolerated  4. Discharge Medications: ibuprofen, percocet and medications prior to admission      Information for the patient's :  Tonya Jane, Male Caesar Bye [[id number]]   , Low Transverse    Patient doing well without significant complaint. Tolerating diet, passing flatus, voiding and ambulating without difficulty    Vitals:  Visit Vitals  /69 (BP 1 Location: Right arm, BP Patient Position: At rest)   Pulse 91   Temp 97.6 °F (36.4 °C)   Resp 16   Ht 5' 9\" (1.753 m)   Wt 124.3 kg (274 lb)   SpO2 96%   Breastfeeding Unknown   BMI 40.46 kg/m²     Temp (24hrs), Av.1 °F (36.7 °C), Min:97.6 °F (36.4 °C), Max:98.5 °F (36.9 °C)        Exam:        Patient without distress. Abdomen, bowel sounds present, soft, expected tenderness, fundus firm                Wound incision clean, dry and intact               Lower extremities are negative for swelling, cords or tenderness.     Labs:   Lab Results   Component Value Date/Time    WBC 12.3 (H) 2021 01:32 AM    WBC 16.4 (H) 2021 03:25 AM    WBC 15.7 (H) 2021 05:50 PM    WBC 8.7 2021 07:07 PM    WBC 5.8 2020 12:00 AM    WBC 7.7 2019 08:56 AM    WBC 9.1 05/15/2018 12:00 AM    WBC 7.8 2017 12:00 AM    WBC 8.1 2016 11:06 AM    WBC 6.7 2014 10:19 AM    HGB 7.4 (L) 2021 01:32 AM    HGB 8.1 (L) 2021 03:25 AM    HGB 9.3 (L) 2021 05:50 PM    HGB 9.4 (L) 2021 07:07 PM    HGB 13.1 2020 12:00 AM    HGB 12.7 2019 08:56 AM    HGB 12.3 05/15/2018 12:00 AM    HGB 13.0 2017 12:00 AM    HGB 13.7 2016 11:06 AM    HGB 14.4 2014 10:19 AM    HCT 23.8 (L) 2021 01:32 AM    HCT 25.6 (L) 09/24/2021 03:25 AM    HCT 29.3 (L) 09/23/2021 05:50 PM    HCT 30.2 (L) 09/22/2021 07:07 PM    HCT 39.7 02/25/2020 12:00 AM    HCT 37.8 02/12/2019 08:56 AM    HCT 38.5 05/15/2018 12:00 AM    HCT 38.8 07/06/2017 12:00 AM    HCT 40.1 02/11/2016 11:06 AM    HCT 43.3 05/22/2014 10:19 AM    PLATELET 811 50/99/4703 01:32 AM    PLATELET 114 64/60/6694 03:25 AM    PLATELET 109 85/03/6685 05:50 PM    PLATELET 643 68/12/5089 07:07 PM    PLATELET 138 86/29/7564 12:00 AM    PLATELET 243 85/13/0070 08:56 AM    PLATELET 401 39/82/6476 12:00 AM    PLATELET 723 83/65/6355 12:00 AM    PLATELET 847 58/27/6969 11:06 AM    PLATELET 053 73/84/5703 10:19 AM       No results found for this or any previous visit (from the past 24 hour(s)).

## 2021-09-29 NOTE — PROGRESS NOTES
0930-In room with Dr Christa Obando while rounding. Orders given to discontinue antibiotics due to patient being afebrile for 24hrs and change lovenox dose from every 12 hours to every 24 hrs. New orders entered. 1500- nurse noted that Dr Christa Obando did not enter rounding note for today. Call placed to L&D. Per labor nurse, Dr Christa Obando already left. Spoke to my miu charge nurse Loreto Valdes. Per Constanza Spear, she will have Dr Christa Obando enter note when he rounds in the morning.

## 2021-12-16 ENCOUNTER — OFFICE VISIT (OUTPATIENT)
Dept: INTERNAL MEDICINE CLINIC | Age: 36
End: 2021-12-16
Payer: COMMERCIAL

## 2021-12-16 VITALS
TEMPERATURE: 98.2 F | WEIGHT: 238.8 LBS | DIASTOLIC BLOOD PRESSURE: 87 MMHG | SYSTOLIC BLOOD PRESSURE: 125 MMHG | RESPIRATION RATE: 20 BRPM | HEIGHT: 69 IN | HEART RATE: 90 BPM | OXYGEN SATURATION: 98 % | BODY MASS INDEX: 35.37 KG/M2

## 2021-12-16 DIAGNOSIS — F41.9 ANXIETY: ICD-10-CM

## 2021-12-16 DIAGNOSIS — L98.9 BUMPS ON SKIN: ICD-10-CM

## 2021-12-16 DIAGNOSIS — Z13.220 SCREENING CHOLESTEROL LEVEL: ICD-10-CM

## 2021-12-16 DIAGNOSIS — R53.83 FATIGUE, UNSPECIFIED TYPE: Primary | ICD-10-CM

## 2021-12-16 PROCEDURE — 99214 OFFICE O/P EST MOD 30 MIN: CPT | Performed by: PHYSICIAN ASSISTANT

## 2021-12-16 RX ORDER — ALPRAZOLAM 0.25 MG/1
TABLET ORAL
Qty: 30 TABLET | Refills: 0 | Status: SHIPPED | OUTPATIENT
Start: 2021-12-16 | End: 2022-02-10 | Stop reason: DRUGHIGH

## 2021-12-16 RX ORDER — SERTRALINE HYDROCHLORIDE 100 MG/1
TABLET, FILM COATED ORAL
COMMUNITY

## 2021-12-16 NOTE — PROGRESS NOTES
1. Have you been to the ER, urgent care clinic since your last visit? Hospitalized since your last visit? No    2. Have you seen or consulted any other health care providers outside of the [address] [place] [address] since your last visit? Include any pap smears or colon screening. No    Health Maintenance Due   Topic Date Due    Hepatitis C Screening  Never done    COVID-19 Vaccine (1) Never done    Flu Vaccine (1) 09/01/2021     Patient would like to discuss focus issues.

## 2021-12-18 NOTE — PROGRESS NOTES
Chief Complaint   Patient presents with    Follow-up     she is a 31y.o. year old female who presents for evaluation. The patient presents for follow-up. Since the last time she was here she gave birth to a healthy baby boy. She reports that it was a bit of a struggle when she first came home getting used to taking care of the new baby in breast-feeding. The patient states that she felt as though she was not connecting with the baby and this was concerning. The patient states she then was put on sertraline to help with some mild postpartum depression. The patient states that she is concerned as well about her concentration. The patient states that she is not sure if maybe she has ADD. She reports since working from home she noticed that it is hard to focus and at times she feels as though she is forgetting things. Lastly the patient states that she has 2 bumps on her inner thigh. She reports about a year and a half ago she had a tick embedded in her leg. She reports that she removed the tick but is not sure if maybe the head is still present. She replaced the area where she removed the tick is slightly raised and she noticed she has a similar bump just above it now          Reviewed PmHx, RxHx, FmHx, SocHx, AllgHx and updated and dated in the chart. Review of Systems - negative except as listed above    Objective:     Vitals:    12/16/21 1341   BP: 125/87   Pulse: 90   Resp: 20   Temp: 98.2 °F (36.8 °C)   TempSrc: Temporal   SpO2: 98%   Weight: 238 lb 12.8 oz (108.3 kg)   Height: 5' 9\" (1.753 m)     Physical Examination: General appearance - alert, well appearing, and in no distress  Mental status - normal mood, behavior, speech, dress, motor activity, and thought processes  Skin-right inner thigh 2 very small slightly raised papules noted. Both are skin colored in appearance. Both are less than 0.25 of a pencil eraser. Assessment/ Plan:   Diagnoses and all orders for this visit:    1.  Fatigue, unspecified type  -     TSH 3RD GENERATION; Future  -     CBC WITH AUTOMATED DIFF; Future  -     METABOLIC PANEL, COMPREHENSIVE; Future    2. Screening cholesterol level  -     LIPID PANEL; Future    3. Bumps on skin  -     REFERRAL TO DERMATOLOGY    4. Anxiety  -     ALPRAZolam (XANAX) 0.25 mg tablet; TAKE 1 TO 2 TABLETS BY MOUTH EVERY 8 HOURS IF  NEEDED       I will send the patient information by my chart in reference to clinicians that could test her for ADD. The patient has been given a low-dose of Xanax that she may use as needed. She has been given a referral to follow-up with a dermatologist.  I explained to the patient that I am not sure if perhaps maybe taking it is still present. Lastly patient is to get her labs done when she is fasting. Total encounter time was 30 minutes; over 50% of the time was spent counseling and coordinating care regarding fatigue, new baby stressors and add testing    I have discussed the diagnosis with the patient and the intended plan as seen in the above orders. The patient has received an after-visit summary and questions were answered concerning future plans.      Medication Side Effects and Warnings were discussed with patient: yes  Patient Labs were reviewed and or requested: yes  Patient Past Records were reviewed and or requested  yes    Cleo Valdovinos PA-C

## 2021-12-21 LAB
ALBUMIN SERPL-MCNC: 4.3 G/DL (ref 3.8–4.8)
ALBUMIN/GLOB SERPL: 2 {RATIO} (ref 1.2–2.2)
ALP SERPL-CCNC: 90 IU/L (ref 44–121)
ALT SERPL-CCNC: 49 IU/L (ref 0–32)
AST SERPL-CCNC: 25 IU/L (ref 0–40)
BASOPHILS # BLD AUTO: 0 X10E3/UL (ref 0–0.2)
BASOPHILS NFR BLD AUTO: 1 %
BILIRUB SERPL-MCNC: 0.3 MG/DL (ref 0–1.2)
BUN SERPL-MCNC: 13 MG/DL (ref 6–20)
BUN/CREAT SERPL: 19 (ref 9–23)
CALCIUM SERPL-MCNC: 8.9 MG/DL (ref 8.7–10.2)
CHLORIDE SERPL-SCNC: 104 MMOL/L (ref 96–106)
CHOLEST SERPL-MCNC: 286 MG/DL (ref 100–199)
CO2 SERPL-SCNC: 21 MMOL/L (ref 20–29)
CREAT SERPL-MCNC: 0.67 MG/DL (ref 0.57–1)
EOSINOPHIL # BLD AUTO: 0 X10E3/UL (ref 0–0.4)
EOSINOPHIL NFR BLD AUTO: 1 %
ERYTHROCYTE [DISTWIDTH] IN BLOOD BY AUTOMATED COUNT: 18.2 % (ref 11.7–15.4)
GLOBULIN SER CALC-MCNC: 2.2 G/DL (ref 1.5–4.5)
GLUCOSE SERPL-MCNC: 89 MG/DL (ref 65–99)
HCT VFR BLD AUTO: 36.1 % (ref 34–46.6)
HDLC SERPL-MCNC: 63 MG/DL
HGB BLD-MCNC: 11.2 G/DL (ref 11.1–15.9)
IMM GRANULOCYTES # BLD AUTO: 0 X10E3/UL (ref 0–0.1)
IMM GRANULOCYTES NFR BLD AUTO: 0 %
IMP & REVIEW OF LAB RESULTS: NORMAL
LDLC SERPL CALC-MCNC: 212 MG/DL (ref 0–99)
LYMPHOCYTES # BLD AUTO: 2.4 X10E3/UL (ref 0.7–3.1)
LYMPHOCYTES NFR BLD AUTO: 32 %
MCH RBC QN AUTO: 23.3 PG (ref 26.6–33)
MCHC RBC AUTO-ENTMCNC: 31 G/DL (ref 31.5–35.7)
MCV RBC AUTO: 75 FL (ref 79–97)
MONOCYTES # BLD AUTO: 0.5 X10E3/UL (ref 0.1–0.9)
MONOCYTES NFR BLD AUTO: 7 %
NEUTROPHILS # BLD AUTO: 4.5 X10E3/UL (ref 1.4–7)
NEUTROPHILS NFR BLD AUTO: 59 %
PLATELET # BLD AUTO: 404 X10E3/UL (ref 150–450)
POTASSIUM SERPL-SCNC: 4.2 MMOL/L (ref 3.5–5.2)
PROT SERPL-MCNC: 6.5 G/DL (ref 6–8.5)
RBC # BLD AUTO: 4.81 X10E6/UL (ref 3.77–5.28)
SODIUM SERPL-SCNC: 139 MMOL/L (ref 134–144)
TRIGL SERPL-MCNC: 73 MG/DL (ref 0–149)
TSH SERPL DL<=0.005 MIU/L-ACNC: 1.59 UIU/ML (ref 0.45–4.5)
VLDLC SERPL CALC-MCNC: 11 MG/DL (ref 5–40)
WBC # BLD AUTO: 7.5 X10E3/UL (ref 3.4–10.8)

## 2022-02-10 ENCOUNTER — VIRTUAL VISIT (OUTPATIENT)
Dept: INTERNAL MEDICINE CLINIC | Age: 37
End: 2022-02-10
Payer: COMMERCIAL

## 2022-02-10 DIAGNOSIS — E66.01 SEVERE OBESITY (BMI 35.0-39.9) WITH COMORBIDITY (HCC): ICD-10-CM

## 2022-02-10 DIAGNOSIS — Z91.89 LACK OF MOTIVATION: ICD-10-CM

## 2022-02-10 DIAGNOSIS — R68.89 DIFFICULTY WITH HOUSEHOLD TASKS: ICD-10-CM

## 2022-02-10 DIAGNOSIS — F41.9 ANXIETY: Primary | ICD-10-CM

## 2022-02-10 PROCEDURE — 99214 OFFICE O/P EST MOD 30 MIN: CPT | Performed by: PHYSICIAN ASSISTANT

## 2022-02-10 RX ORDER — ALPRAZOLAM 1 MG/1
1 TABLET ORAL
Qty: 12 TABLET | Refills: 0 | Status: SHIPPED | OUTPATIENT
Start: 2022-02-10

## 2022-02-10 NOTE — PROGRESS NOTES
Cathy Yarbrough is a 31 y.o. female who was seen by synchronous (real-time) audio-video technology on 2/10/2022 for Anxiety and Medication Evaluation        Assessment & Plan:   Diagnoses and all orders for this visit:    1. Anxiety  -     REFERRAL TO NEUROPSYCHOLOGY  -     ALPRAZolam (XANAX) 1 mg tablet; Take 1 Tablet by mouth three (3) times daily as needed for Anxiety. Max Daily Amount: 3 mg.    2. Lack of motivation  -     REFERRAL TO NEUROPSYCHOLOGY    3. Difficulty with household tasks  -     REFERRAL TO NEUROPSYCHOLOGY    4. Severe obesity (BMI 35.0-39. 9) with comorbidity ([address]    The patient and I spoke at length about how she has been feeling. We have decided that we will reassess the use of the Zoloft once she has been on it for 6 months. At that point we will to try to titrate if possible. Also I have asked the patient to try Xanax 1 mg. If with the increase of dose she still feels it is not working then we would need to come up with a different plan. I also feel that the patient should be properly evaluated to make sure she does not have ADD. I will my chart her information about the neuropsychologist.    I spent at least 30 minutes on this visit with this established patient. [id number]  Subjective:   Patient presents for follow-up. She reports since being on the Zoloft 100 mg the depression seems to be better. She reports however that the anxiety is about the same and reports that she does not feel the Xanax helps. She is currently taking 0.25 to . 50 mg  as needed. An example when she feels she may would need the Xanax is when she is around her 's family. The patient states that they have very nice people but she finds at times that they will over talk her. The patient states this can be annoying and so she tries to take the medication to keep the edge off. He also shares with me that although at work she is very focused she finds at home she can be very inconsistent.   She reports she can start a task and before that test is done she may have moved on to a different one. In the past I given her some names to a therapist but she did not like the one and did not look to find a different one. Prior to Admission medications    Medication Sig Start Date End Date Taking? Authorizing Provider   ALPRAZolam [place]) 1 mg tablet Take 1 Tablet by mouth three (3) times daily as needed for Anxiety. Max Daily Amount: 3 mg. 2/10/22  Yes Cleo Valdovinos PA-C   sertraline (Zoloft) 100 mg tablet Zoloft 100 mg tablet   Take 1 tablet every day by oral route. Yes Provider, Historical   PNV Comb #2-Iron-FA-Omega 3 29-1-400 mg cmpk Take  by mouth. Yes Provider, Historical   fluticasone (FLONASE) 50 mcg/actuation nasal spray 2 sprays by Both Nostrils route daily. 11/24/14  Yes Marivel Valdovinos PA-C   ALPRAZolam (XANAX) 0.25 mg tablet TAKE 1 TO 2 TABLETS BY MOUTH EVERY 8 HOURS IF  NEEDED 12/16/21 2/10/22  Cleo Valdovinos PA-C     No Known Allergies    ROS  See above  Objective:     Patient-Reported Vitals 10/19/2020   Patient-Reported Weight 235   Patient-Reported Height 5'9\"   Patient-Reported Pulse 89   Patient-Reported Temperature 98   Patient-Reported Systolic  -   Patient-Reported Diastolic -   Patient-Reported LMP September 19th      General: alert, cooperative, no distress   Mental  status: normal mood, behavior, speech, dress, motor activity, and thought processes, able to follow commands   HENT: NCAT   Neck: no visualized mass   Resp: no respiratory distress   Neuro: no gross deficits   Skin: no discoloration or lesions of concern on visible areas   Psychiatric: normal affect, consistent with stated mood, no evidence of hallucinations     Additional exam findings: We discussed the expected course, resolution and complications of the diagnosis(es) in detail. Medication risks, benefits, costs, interactions, and alternatives were discussed as indicated.   I advised her to contact the office if her condition worsens, changes or fails to improve as anticipated. She expressed understanding with the diagnosis(es) and plan. Thom Escalante, was evaluated through a synchronous (real-time) audio-video encounter. The patient (or guardian if applicable) is aware that this is a billable service, which includes applicable co-pays. Verbal consent to proceed has been obtained. The visit was conducted pursuant to the emergency declaration under the [address] [place] [address], [address] [place] [address] authority and the [employer] Act. Patient identification was verified, and a caregiver was present when appropriate. The patient was located at home in a state where the provider was licensed to provide care.     Nata Valdovinos PA-C

## 2022-02-10 NOTE — PROGRESS NOTES
1. Have you been to the ER, urgent care clinic since your last visit? Hospitalized since your last visit? No    2. Have you seen or consulted any other health care providers outside of the [address] [place] [address] since your last visit? Include any pap smears or colon screening. Yes, chiropractor     Health Maintenance Due   Topic Date Due    Hepatitis C Screening  Never done    COVID-19 Vaccine (1) Never done    Flu Vaccine (1) 09/01/2021     Patient to discuss medication for the anxiety/depression.

## 2022-03-18 PROBLEM — E66.01 SEVERE OBESITY (HCC): Status: ACTIVE | Noted: 2020-02-20

## 2022-03-18 PROBLEM — O36.8330 MATERNAL CARE FOR ABNORMALITIES OF THE FETAL HEART RATE OR RHYTHM, THIRD TRIMESTER, NOT APPLICABLE OR UNSPECIFIED: Status: ACTIVE | Noted: 2021-09-23

## 2022-03-18 PROBLEM — O40.3XX0: Status: ACTIVE | Noted: 2021-09-22

## 2022-03-19 PROBLEM — Z3A.40 40 WEEKS GESTATION OF PREGNANCY: Status: ACTIVE | Noted: 2021-09-23

## 2022-03-20 PROBLEM — O41.1230 CHORIOAMNIONITIS IN THIRD TRIMESTER: Status: ACTIVE | Noted: 2021-09-23

## 2023-01-20 ENCOUNTER — NURSE TRIAGE (OUTPATIENT)
Dept: OTHER | Facility: CLINIC | Age: 38
End: 2023-01-20

## 2023-01-20 NOTE — TELEPHONE ENCOUNTER
Location of patient: [facility] [facility]  call from Aaliyah Car at [facility] with [employer]; Patient with Red Flag Complaint requesting to establish care with [facility] medicine. Subjective: Caller states \"cough and cold symtoms\"     Current Symptoms: yesterday got sick in the morning with fever and chills, slept most of the day, body aches. Major congestion in head and cannot talk very well. Headache is bad and mostly on right side of head. Sore throat painful to swallow. Left side is swollen and white patches. Denies diarrhea but did have episode of nausea when eating, no vomiting. Denies chest pain or shortness of breath    Onset: 1 day ago; sudden    Associated Symptoms: reduced appetite    Pain Severity: 7/10 throat, 7/10 headache; aching; constant    Temperature: 101 orally    What has been tried: dayquil, nyquil    LMP:  January 10th  Pregnant: Unknown    Recommended disposition: See in Office Today    Care advice provided, patient verbalizes understanding; denies any other questions or concerns; instructed to call back for any new or worsening symptoms. Patient/Caller agrees with recommended disposition; writer provided warm transfer to Kendrick at [facility] for appointment scheduling    Attention Provider: Thank you for allowing me to participate in the care of your patient. The patient was connected to triage in response to information provided to the [facility]. Please do not respond through this encounter as the response is not directed to a shared pool.     Reason for Disposition   SEVERE sore throat pain    Protocols used: Sore Throat-ADULT-[place]

## 2023-05-25 RX ORDER — SERTRALINE HYDROCHLORIDE 100 MG/1
TABLET, FILM COATED ORAL
COMMUNITY

## 2023-05-25 RX ORDER — ALPRAZOLAM 1 MG/1
1 TABLET ORAL 3 TIMES DAILY PRN
COMMUNITY
Start: 2022-02-10

## 2023-05-25 RX ORDER — FLUTICASONE PROPIONATE 50 MCG
2 SPRAY, SUSPENSION (ML) NASAL DAILY
COMMUNITY
Start: 2014-11-24

## 2025-03-19 NOTE — PROGRESS NOTES
Problem: Chronic Conditions and Co-morbidities  Goal: Patient's chronic conditions and co-morbidity symptoms are monitored and maintained or improved  Outcome: Progressing  Flowsheets (Taken 3/18/2025 2313)  Care Plan - Patient's Chronic Conditions and Co-Morbidity Symptoms are Monitored and Maintained or Improved:   Monitor and assess patient's chronic conditions and comorbid symptoms for stability, deterioration, or improvement   Collaborate with multidisciplinary team to address chronic and comorbid conditions and prevent exacerbation or deterioration     Problem: Pain  Goal: Verbalizes/displays adequate comfort level or baseline comfort level  Outcome: Progressing  Flowsheets (Taken 3/18/2025 2313)  Verbalizes/displays adequate comfort level or baseline comfort level:   Encourage patient to monitor pain and request assistance   Administer analgesics based on type and severity of pain and evaluate response     Problem: Skin/Tissue Integrity  Goal: Skin integrity remains intact  Description: 1.  Monitor for areas of redness and/or skin breakdown  2.  Assess vascular access sites hourly  3.  Every 4-6 hours minimum:  Change oxygen saturation probe site  4.  Every 4-6 hours:  If on nasal continuous positive airway pressure, respiratory therapy assess nares and determine need for appliance change or resting period  Recent Flowsheet Documentation  Taken 3/18/2025 2210 by Hallie Jones RN  Skin Integrity Remains Intact:   Monitor for areas of redness and/or skin breakdown   Assess vascular access sites hourly      1835: Patient arrived to labor and delivery for scheduled elective induction, cervical ripening. Pt changed into gown and oriented to room and unit. This RN discussing POC with patient, pt verbalizing understanding. 1900: Bedside, Verbal and Written shift change report given to JOHAN Jefferson (oncoming nurse) by Carmel Freeman RN (offgoing nurse). Report included the following information SBAR, Kardex, Intake/Output, MAR, Accordion and Recent Results.

## (undated) DEVICE — STERILE POLYISOPRENE POWDER-FREE SURGICAL GLOVES: Brand: PROTEXIS

## (undated) DEVICE — HANDLE LT SNAP ON ULT DURABLE LENS FOR TRUMPF ALC DISPOSABLE

## (undated) DEVICE — BULB SYRINGE, IRRIGATION WITH PROTECTIVE CAP, 60 CC, INDIVIDUALLY WRAPPED: Brand: DOVER

## (undated) DEVICE — 3000CC GUARDIAN II: Brand: GUARDIAN

## (undated) DEVICE — GARMENT,MEDLINE,DVT,INT,CALF,MED, GEN2: Brand: MEDLINE

## (undated) DEVICE — PREP SKN CHLRAPRP APL 26ML STR --

## (undated) DEVICE — SUTURE VCRL SZ 0 L36IN ABSRB VLT L40MM CT 1/2 CIR J358H

## (undated) DEVICE — TRAY PREP DRY W/ PREM GLV 2 APPL 6 SPNG 2 UNDPD 1 OVERWRAP

## (undated) DEVICE — SOLUTION IRRIG 1000ML 0.9% SOD CHL USP POUR PLAS BTL

## (undated) DEVICE — DERMABOND SKIN ADH 0.7ML -- DERMABOND ADVANCED 12/BX

## (undated) DEVICE — REM POLYHESIVE ADULT PATIENT RETURN ELECTRODE: Brand: VALLEYLAB

## (undated) DEVICE — C-SECTION II-LF: Brand: MEDLINE INDUSTRIES, INC.

## (undated) DEVICE — TIP CLEANER: Brand: VALLEYLAB

## (undated) DEVICE — ROCKER SWITCH PENCIL HOLSTER: Brand: VALLEYLAB

## (undated) DEVICE — SOLIDIFIER FLUID 3000 CC ABSORB

## (undated) DEVICE — SUTURE VCRL SZ 2-0 L27IN ABSRB VLT L40MM CT 1/2 CIR J351H

## (undated) DEVICE — SUTURE MCRYL SZ 4 0 L18IN ABSRB VLT PS 1 L24MM 3 8 CIR REV Y682H

## (undated) DEVICE — TOWEL,OR,DSP,ST,BLUE,STD,2/PK,40PK/CS: Brand: MEDLINE

## (undated) DEVICE — SUTURE PDS II SZ 0 L60IN ABSRB VLT L48MM CTX 1/2 CIR Z990G